# Patient Record
Sex: MALE | Race: WHITE | NOT HISPANIC OR LATINO | Employment: OTHER | URBAN - METROPOLITAN AREA
[De-identification: names, ages, dates, MRNs, and addresses within clinical notes are randomized per-mention and may not be internally consistent; named-entity substitution may affect disease eponyms.]

---

## 2023-06-25 LAB — PROSTATE SPECIFIC ANTIGEN,SCREEN: NORMAL

## 2023-07-06 ENCOUNTER — APPOINTMENT (OUTPATIENT)
Dept: LAB | Facility: LAB | Age: 68
End: 2023-07-06
Payer: MEDICARE

## 2023-07-06 ENCOUNTER — OFFICE VISIT (OUTPATIENT)
Dept: PRIMARY CARE | Facility: CLINIC | Age: 68
End: 2023-07-06
Payer: MEDICARE

## 2023-07-06 VITALS
HEIGHT: 74 IN | BODY MASS INDEX: 26.31 KG/M2 | OXYGEN SATURATION: 99 % | TEMPERATURE: 96.8 F | DIASTOLIC BLOOD PRESSURE: 78 MMHG | HEART RATE: 74 BPM | WEIGHT: 205 LBS | SYSTOLIC BLOOD PRESSURE: 118 MMHG

## 2023-07-06 DIAGNOSIS — M54.42 MIDLINE LOW BACK PAIN WITH BILATERAL SCIATICA, UNSPECIFIED CHRONICITY: Primary | ICD-10-CM

## 2023-07-06 DIAGNOSIS — E78.2 MIXED HYPERLIPIDEMIA: ICD-10-CM

## 2023-07-06 DIAGNOSIS — M54.41 MIDLINE LOW BACK PAIN WITH BILATERAL SCIATICA, UNSPECIFIED CHRONICITY: Primary | ICD-10-CM

## 2023-07-06 DIAGNOSIS — Z11.59 NEED FOR HEPATITIS C SCREENING TEST: ICD-10-CM

## 2023-07-06 DIAGNOSIS — Z00.00 ENCOUNTER FOR ANNUAL PHYSICAL EXAM: ICD-10-CM

## 2023-07-06 DIAGNOSIS — E55.9 VITAMIN D DEFICIENCY: ICD-10-CM

## 2023-07-06 DIAGNOSIS — Z12.11 COLON CANCER SCREENING: ICD-10-CM

## 2023-07-06 DIAGNOSIS — Z11.4 ENCOUNTER FOR SCREENING FOR HIV: ICD-10-CM

## 2023-07-06 PROBLEM — N52.9 ERECTILE DYSFUNCTION: Status: ACTIVE | Noted: 2020-10-30

## 2023-07-06 PROBLEM — H02.059 TRICHIASIS OF EYELID WITHOUT ENTROPION: Status: ACTIVE | Noted: 2023-05-26

## 2023-07-06 PROBLEM — I82.431 ACUTE DEEP VEIN THROMBOSIS (DVT) OF POPLITEAL VEIN OF RIGHT LOWER EXTREMITY (MULTI): Status: RESOLVED | Noted: 2022-02-15 | Resolved: 2023-07-06

## 2023-07-06 PROBLEM — K40.90 REDUCIBLE INGUINAL HERNIA: Status: ACTIVE | Noted: 2018-10-26

## 2023-07-06 PROBLEM — M70.22 OLECRANON BURSITIS, LEFT ELBOW: Status: RESOLVED | Noted: 2021-09-24 | Resolved: 2023-07-06

## 2023-07-06 PROBLEM — H16.102 SUPERFICIAL KERATITIS OF LEFT EYE: Status: RESOLVED | Noted: 2023-05-26 | Resolved: 2023-07-06

## 2023-07-06 PROBLEM — R42 DIZZINESS AND GIDDINESS: Status: RESOLVED | Noted: 2020-11-11 | Resolved: 2023-07-06

## 2023-07-06 PROBLEM — I82.431 ACUTE DEEP VEIN THROMBOSIS (DVT) OF POPLITEAL VEIN OF RIGHT LOWER EXTREMITY (MULTI): Status: ACTIVE | Noted: 2022-02-15

## 2023-07-06 PROBLEM — R05.9 COUGH: Status: RESOLVED | Noted: 2023-07-06 | Resolved: 2023-07-06

## 2023-07-06 PROBLEM — B02.39: Status: RESOLVED | Noted: 2023-05-31 | Resolved: 2023-07-06

## 2023-07-06 PROBLEM — R42 DIZZINESS AND GIDDINESS: Status: ACTIVE | Noted: 2020-11-11

## 2023-07-06 PROBLEM — D17.9 LIPOMA: Status: ACTIVE | Noted: 2023-07-06

## 2023-07-06 PROBLEM — I25.10 CORONARY ARTERY CALCIFICATION: Status: ACTIVE | Noted: 2020-10-30

## 2023-07-06 PROBLEM — I48.0 PAROXYSMAL ATRIAL FIBRILLATION (MULTI): Status: RESOLVED | Noted: 2023-07-06 | Resolved: 2023-07-06

## 2023-07-06 PROBLEM — H02.059 TRICHIASIS OF EYELID WITHOUT ENTROPION: Status: RESOLVED | Noted: 2023-05-26 | Resolved: 2023-07-06

## 2023-07-06 PROBLEM — M70.22 OLECRANON BURSITIS, LEFT ELBOW: Status: ACTIVE | Noted: 2021-09-24

## 2023-07-06 PROBLEM — L60.8 PITTING OF NAILS: Status: RESOLVED | Noted: 2021-09-24 | Resolved: 2023-07-06

## 2023-07-06 PROBLEM — I25.84 CORONARY ARTERY CALCIFICATION: Status: ACTIVE | Noted: 2020-10-30

## 2023-07-06 PROBLEM — N13.8 BPH WITH OBSTRUCTION/LOWER URINARY TRACT SYMPTOMS: Status: RESOLVED | Noted: 2018-10-26 | Resolved: 2023-07-06

## 2023-07-06 PROBLEM — N40.1 BPH WITH OBSTRUCTION/LOWER URINARY TRACT SYMPTOMS: Status: RESOLVED | Noted: 2018-10-26 | Resolved: 2023-07-06

## 2023-07-06 PROBLEM — Z96.641 HISTORY OF RIGHT HIP REPLACEMENT: Status: RESOLVED | Noted: 2021-09-24 | Resolved: 2023-07-06

## 2023-07-06 LAB — PROSTATE SPECIFIC ANTIGEN,SCREEN: 3.28 NG/ML (ref 0–4)

## 2023-07-06 PROCEDURE — 99387 INIT PM E/M NEW PAT 65+ YRS: CPT | Performed by: INTERNAL MEDICINE

## 2023-07-06 PROCEDURE — 1160F RVW MEDS BY RX/DR IN RCRD: CPT | Performed by: INTERNAL MEDICINE

## 2023-07-06 PROCEDURE — 1159F MED LIST DOCD IN RCRD: CPT | Performed by: INTERNAL MEDICINE

## 2023-07-06 PROCEDURE — 1126F AMNT PAIN NOTED NONE PRSNT: CPT | Performed by: INTERNAL MEDICINE

## 2023-07-06 RX ORDER — TAMSULOSIN HYDROCHLORIDE 0.4 MG/1
0.4 CAPSULE ORAL EVERY EVENING
COMMUNITY

## 2023-07-06 ASSESSMENT — ENCOUNTER SYMPTOMS
HEMATURIA: 0
HALLUCINATIONS: 0
DIZZINESS: 0
NUMBNESS: 0
NERVOUS/ANXIOUS: 0
SPEECH DIFFICULTY: 0
ABDOMINAL DISTENTION: 0
POLYPHAGIA: 0
ARTHRALGIAS: 1
MYALGIAS: 1
SHORTNESS OF BREATH: 0
CONFUSION: 0
DYSURIA: 0
WEAKNESS: 0
RHINORRHEA: 0
DIARRHEA: 0
FLANK PAIN: 0
POLYDIPSIA: 0
FATIGUE: 0
BACK PAIN: 1
BRUISES/BLEEDS EASILY: 0
NECK STIFFNESS: 0
FEVER: 0
SINUS PRESSURE: 0
HEADACHES: 0
SINUS PAIN: 0
STRIDOR: 0
ABDOMINAL PAIN: 0
NAUSEA: 0
SEIZURES: 0
EYE REDNESS: 0
TROUBLE SWALLOWING: 0
COLOR CHANGE: 0
DYSPHORIC MOOD: 0
VOMITING: 0
WHEEZING: 0
CONSTIPATION: 0
CHOKING: 0
COUGH: 0
VOICE CHANGE: 0
PALPITATIONS: 0
DIFFICULTY URINATING: 0
ACTIVITY CHANGE: 0
FACIAL ASYMMETRY: 0
BLOOD IN STOOL: 0
EYE DISCHARGE: 0
APPETITE CHANGE: 0
FREQUENCY: 0
CHEST TIGHTNESS: 0
PHOTOPHOBIA: 0

## 2023-07-06 ASSESSMENT — PAIN SCALES - GENERAL: PAINLEVEL: 0-NO PAIN

## 2023-07-06 NOTE — PROGRESS NOTES
Subjective   Patient ID: Austyn Alfaro is a 67 y.o. male who presents for Establish Care (New patient, bilateral leg pain at night since the beginning of the year).    HPI   Patient previous PCP moved to [facility] and he need new PCP.    Only medication he is taking is Flomax for BPH and his PSA level was checked 6 months ago and it was 2.94.  Patient is having symptoms of Arthralgia from past several months. He also has low back pain which started more than 2 decades ago but lately from past few months he noticed pain in both legs. Its especially worse when laying down on the side. He is a side sleeper but lately sleeping on his back. The pain in lower extremities is more isolated and localized to hip, knee and ankles on both sides. His pain is more when going to bed but improves as the day progresses.    He had routine labs done in 2021 and his LDL is high and need to be checked again.  He is also due for screening colonoscopy and last one was done in 2007.    He had right hip replacement more than 10 years ago because of severe hip OA.     Review of Systems   Constitutional:  Negative for activity change, appetite change, fatigue and fever.   HENT:  Negative for congestion, dental problem, ear pain, hearing loss, rhinorrhea, sinus pressure, sinus pain, trouble swallowing and voice change.    Eyes:  Negative for photophobia, discharge, redness and visual disturbance.   Respiratory:  Negative for cough, choking, chest tightness, shortness of breath, wheezing and stridor.    Cardiovascular:  Negative for chest pain, palpitations and leg swelling.   Gastrointestinal:  Negative for abdominal distention, abdominal pain, blood in stool, constipation, diarrhea, nausea and vomiting.   Endocrine: Negative for polydipsia, polyphagia and polyuria.   Genitourinary:  Negative for difficulty urinating, dysuria, flank pain, frequency, hematuria and urgency.   Musculoskeletal:  Positive for arthralgias, back pain and myalgias.  "Negative for neck stiffness.   Skin:  Negative for color change and rash.   Allergic/Immunologic: Negative for immunocompromised state.   Neurological:  Negative for dizziness, seizures, syncope, facial asymmetry, speech difficulty, weakness, numbness and headaches.   Hematological:  Does not bruise/bleed easily.   Psychiatric/Behavioral:  Negative for behavioral problems, confusion, dysphoric mood, hallucinations and suicidal ideas. The patient is not nervous/anxious.      Objective   /78   Pulse 74   Temp 36 °C (96.8 °F)   Ht 1.88 m (6' 2\")   Wt 93 kg (205 lb)   SpO2 99%   BMI 26.32 kg/m²     Physical Exam  Constitutional:       General: He is not in acute distress.     Appearance: Normal appearance. He is not ill-appearing or toxic-appearing.   HENT:      Head: Normocephalic and atraumatic.      Nose: No congestion or rhinorrhea.   Eyes:      General: No scleral icterus.        Right eye: No discharge.         Left eye: No discharge.      Extraocular Movements: Extraocular movements intact.      Conjunctiva/sclera: Conjunctivae normal.      Pupils: Pupils are equal, round, and reactive to light.   Pulmonary:      Effort: Pulmonary effort is normal.   Musculoskeletal:         General: No swelling or deformity. Normal range of motion.      Cervical back: Normal range of motion.      Right lower leg: No edema.      Left lower leg: No edema.   Skin:     Coloration: Skin is not jaundiced.      Findings: No erythema or rash.   Neurological:      General: No focal deficit present.      Mental Status: He is alert and oriented to person, place, and time.      Cranial Nerves: No cranial nerve deficit.      Motor: No weakness.      Coordination: Coordination normal.      Gait: Gait normal.   Psychiatric:         Mood and Affect: Mood normal.         Behavior: Behavior normal.         Thought Content: Thought content normal.         Judgment: Judgment normal.       Assessment/Plan   Problem List Items Addressed " This Visit    None  Visit Diagnoses       Midline low back pain with bilateral sciatica, unspecified chronicity    -  Primary    Relevant Orders    MR lumbar spine wo IV contrast    Colon cancer screening        Relevant Orders    Colonoscopy    Encounter for annual physical exam        Relevant Orders    Hemoglobin A1C    Comprehensive Metabolic Panel    Tsh With Reflex To Free T4 If Abnormal    Mixed hyperlipidemia        Relevant Orders    Lipid Panel    Need for hepatitis C screening test        Relevant Orders    Hepatitis C antibody    Encounter for screening for HIV        Relevant Orders    HIV 1/2 Antigen/Antibody Screen with Reflex to Confirmation    Vitamin D deficiency        Relevant Orders    CBC and Auto Differential    Vitamin D, Total        I will do full physical exam next time. Today it was first visit and reviews of all the information and discussion with patient took more than expected. Routine labs were ordered and Colonoscopy ordered and printed as patient requested.

## 2023-08-07 ENCOUNTER — TELEPHONE (OUTPATIENT)
Dept: PRIMARY CARE | Facility: CLINIC | Age: 68
End: 2023-08-07
Payer: MEDICARE

## 2023-08-07 NOTE — TELEPHONE ENCOUNTER
----- Message from Xavier Newton MD sent at 8/7/2023  9:38 AM EDT -----  I looked at your MRI Lumbar spine. You do have intervertebral disc problem which is consistent with your symptoms at multiple level. Its hard for me to describe it over the phone. Its not emergency and I can discuss with you at follow up visit but if you do have concern than you can come sooner. If you are feeling better than we dont have to do anything else.

## 2023-09-09 PROBLEM — C44.722 SQUAMOUS CELL CARCINOMA OF SKIN OF RIGHT LOWER LIMB, INCLUDING HIP: Status: ACTIVE | Noted: 2023-02-01

## 2023-09-27 ENCOUNTER — LAB (OUTPATIENT)
Dept: LAB | Facility: LAB | Age: 68
End: 2023-09-27
Payer: MEDICARE

## 2023-09-27 DIAGNOSIS — Z00.00 ENCOUNTER FOR ANNUAL PHYSICAL EXAM: ICD-10-CM

## 2023-09-27 DIAGNOSIS — Z11.4 ENCOUNTER FOR SCREENING FOR HIV: ICD-10-CM

## 2023-09-27 DIAGNOSIS — E55.9 VITAMIN D DEFICIENCY: ICD-10-CM

## 2023-09-27 DIAGNOSIS — Z11.59 NEED FOR HEPATITIS C SCREENING TEST: ICD-10-CM

## 2023-09-27 DIAGNOSIS — E78.2 MIXED HYPERLIPIDEMIA: ICD-10-CM

## 2023-09-27 LAB
ALANINE AMINOTRANSFERASE (SGPT) (U/L) IN SER/PLAS: 27 U/L (ref 10–52)
ALBUMIN (G/DL) IN SER/PLAS: 4.1 G/DL (ref 3.4–5)
ALKALINE PHOSPHATASE (U/L) IN SER/PLAS: 69 U/L (ref 33–136)
ANION GAP IN SER/PLAS: 12 MMOL/L (ref 10–20)
ASPARTATE AMINOTRANSFERASE (SGOT) (U/L) IN SER/PLAS: 26 U/L (ref 9–39)
BASOPHILS (10*3/UL) IN BLOOD BY AUTOMATED COUNT: 0.06 X10E9/L (ref 0–0.1)
BASOPHILS/100 LEUKOCYTES IN BLOOD BY AUTOMATED COUNT: 0.9 % (ref 0–2)
BILIRUBIN TOTAL (MG/DL) IN SER/PLAS: 0.7 MG/DL (ref 0–1.2)
CALCIUM (MG/DL) IN SER/PLAS: 8.9 MG/DL (ref 8.6–10.3)
CARBON DIOXIDE, TOTAL (MMOL/L) IN SER/PLAS: 29 MMOL/L (ref 21–32)
CHLORIDE (MMOL/L) IN SER/PLAS: 103 MMOL/L (ref 98–107)
CHOLESTEROL (MG/DL) IN SER/PLAS: 217 MG/DL (ref 0–199)
CHOLESTEROL IN HDL (MG/DL) IN SER/PLAS: 51.7 MG/DL
CHOLESTEROL/HDL RATIO: 4.2
CREATININE (MG/DL) IN SER/PLAS: 0.82 MG/DL (ref 0.5–1.3)
EOSINOPHILS (10*3/UL) IN BLOOD BY AUTOMATED COUNT: 0.51 X10E9/L (ref 0–0.7)
EOSINOPHILS/100 LEUKOCYTES IN BLOOD BY AUTOMATED COUNT: 7.7 % (ref 0–6)
ERYTHROCYTE DISTRIBUTION WIDTH (RATIO) BY AUTOMATED COUNT: 13 % (ref 11.5–14.5)
ERYTHROCYTE MEAN CORPUSCULAR HEMOGLOBIN CONCENTRATION (G/DL) BY AUTOMATED: 32.8 G/DL (ref 32–36)
ERYTHROCYTE MEAN CORPUSCULAR VOLUME (FL) BY AUTOMATED COUNT: 95 FL (ref 80–100)
ERYTHROCYTES (10*6/UL) IN BLOOD BY AUTOMATED COUNT: 4.89 X10E12/L (ref 4.5–5.9)
GFR MALE: >90 ML/MIN/1.73M2
GLUCOSE (MG/DL) IN SER/PLAS: 83 MG/DL (ref 74–99)
HEMATOCRIT (%) IN BLOOD BY AUTOMATED COUNT: 46.4 % (ref 41–52)
HEMOGLOBIN (G/DL) IN BLOOD: 15.2 G/DL (ref 13.5–17.5)
IMMATURE GRANULOCYTES/100 LEUKOCYTES IN BLOOD BY AUTOMATED COUNT: 0.2 % (ref 0–0.9)
LDL: 152 MG/DL (ref 0–99)
LEUKOCYTES (10*3/UL) IN BLOOD BY AUTOMATED COUNT: 6.6 X10E9/L (ref 4.4–11.3)
LYMPHOCYTES (10*3/UL) IN BLOOD BY AUTOMATED COUNT: 0.98 X10E9/L (ref 1.2–4.8)
LYMPHOCYTES/100 LEUKOCYTES IN BLOOD BY AUTOMATED COUNT: 14.8 % (ref 13–44)
MONOCYTES (10*3/UL) IN BLOOD BY AUTOMATED COUNT: 0.83 X10E9/L (ref 0.1–1)
MONOCYTES/100 LEUKOCYTES IN BLOOD BY AUTOMATED COUNT: 12.5 % (ref 2–10)
NEUTROPHILS (10*3/UL) IN BLOOD BY AUTOMATED COUNT: 4.24 X10E9/L (ref 1.2–7.7)
NEUTROPHILS/100 LEUKOCYTES IN BLOOD BY AUTOMATED COUNT: 63.9 % (ref 40–80)
PLATELETS (10*3/UL) IN BLOOD AUTOMATED COUNT: 289 X10E9/L (ref 150–450)
POTASSIUM (MMOL/L) IN SER/PLAS: 4.8 MMOL/L (ref 3.5–5.3)
PROTEIN TOTAL: 6.9 G/DL (ref 6.4–8.2)
SODIUM (MMOL/L) IN SER/PLAS: 139 MMOL/L (ref 136–145)
THYROTROPIN (MIU/L) IN SER/PLAS BY DETECTION LIMIT <= 0.05 MIU/L: 1 MIU/L (ref 0.44–3.98)
TRIGLYCERIDE (MG/DL) IN SER/PLAS: 66 MG/DL (ref 0–149)
UREA NITROGEN (MG/DL) IN SER/PLAS: 17 MG/DL (ref 6–23)
VLDL: 13 MG/DL (ref 0–40)

## 2023-09-27 PROCEDURE — 85025 COMPLETE CBC W/AUTO DIFF WBC: CPT

## 2023-09-27 PROCEDURE — 86803 HEPATITIS C AB TEST: CPT

## 2023-09-27 PROCEDURE — 84443 ASSAY THYROID STIM HORMONE: CPT

## 2023-09-27 PROCEDURE — 36415 COLL VENOUS BLD VENIPUNCTURE: CPT

## 2023-09-27 PROCEDURE — 87389 HIV-1 AG W/HIV-1&-2 AB AG IA: CPT

## 2023-09-27 PROCEDURE — 82306 VITAMIN D 25 HYDROXY: CPT

## 2023-09-27 PROCEDURE — 83036 HEMOGLOBIN GLYCOSYLATED A1C: CPT

## 2023-09-27 PROCEDURE — 80053 COMPREHEN METABOLIC PANEL: CPT

## 2023-09-27 PROCEDURE — 80061 LIPID PANEL: CPT

## 2023-09-28 LAB
CALCIDIOL (25 OH VITAMIN D3) (NG/ML) IN SER/PLAS: 28 NG/ML
ESTIMATED AVERAGE GLUCOSE FOR HBA1C: 105 MG/DL
HEMOGLOBIN A1C/HEMOGLOBIN TOTAL IN BLOOD: 5.3 %
HEPATITIS C VIRUS AB PRESENCE IN SERUM: NONREACTIVE
HIV 1/ 2 AG/AB SCREEN: NONREACTIVE

## 2023-10-02 ENCOUNTER — EVALUATION (OUTPATIENT)
Dept: PHYSICAL THERAPY | Facility: CLINIC | Age: 68
End: 2023-10-02
Payer: MEDICARE

## 2023-10-02 DIAGNOSIS — M79.604 LOWER EXTREMITY PAIN, BILATERAL: Primary | ICD-10-CM

## 2023-10-02 DIAGNOSIS — M79.605 LOWER EXTREMITY PAIN, BILATERAL: Primary | ICD-10-CM

## 2023-10-02 DIAGNOSIS — M54.50 LOW BACK PAIN, UNSPECIFIED: ICD-10-CM

## 2023-10-02 PROCEDURE — 97162 PT EVAL MOD COMPLEX 30 MIN: CPT | Mod: GP | Performed by: PHYSICAL THERAPIST

## 2023-10-02 ASSESSMENT — ENCOUNTER SYMPTOMS
OCCASIONAL FEELINGS OF UNSTEADINESS: 0
DEPRESSION: 0
LOSS OF SENSATION IN FEET: 0

## 2023-10-02 NOTE — LETTER
October 2, 2023     Patient: Austyn Alfaro   YOB: 1955   Date of Visit: 10/2/2023       To Whom it May Concern:    Austyn Alfaro was seen in my clinic on 10/2/2023. He {Return to school/sport:30574}.    If you have any questions or concerns, please don't hesitate to call.         Sincerely,          Dayana Harris, PT        CC: No Recipients

## 2023-10-02 NOTE — LETTER
October 2, 2023     Patient: Austyn Alfaro   YOB: 1955   Date of Visit: 10/2/2023       To Whom It May Concern:    It is my medical opinion that Austyn Alfaro {Work release (duty restriction):87316}.    If you have any questions or concerns, please don't hesitate to call.         Sincerely,        Dayana Harris, PT    CC: No Recipients

## 2023-10-02 NOTE — PROGRESS NOTES
Physical Therapy    Physical Therapy Evaluation    Patient Name: Austyn Alfaro  MRN: [id number]  Today's Date: 10/2/2023  Time Calculation  Start Time: 1015  Stop Time: 1100  Time Calculation (min): 45 min    Assessment:  Pt is a 67 yo male with complaint of bilateral LE pain.  During evaluation, he reports and demonstrates symptoms consistent with a preliminary classification of lumbar derangement. Preliminary directional preference toward lumbar extension.  He will benefit from further PT assessment to confirm classification and directional preference. He may also benefit from further PT for postural strengthening.     PT Assessment Results: Decreased range of motion, Pain (postural impairment)  Rehab Prognosis: Good  Evaluation/Treatment Tolerance: Patient tolerated treatment well    Plan  Treatment/Interventions: Cryotherapy, Education/ Instruction, Hot pack, Manual therapy, Taping techniques, Therapeutic activities, Therapeutic exercises  PT Frequency: 2 times per week  Duration:  (4-8 weeks)  Onset Date:  (script 9-)  Certification Period Start Date: 10/02/23  Certification Period End Date: 01/02/24  Rehab Potential: Good  Plan of Care Agreement: Patient        Subjective   General:  Pt presents to PT with referral from physician for acute lumbar back pain.  He reports intermittent bilateral anterior LE pain 'that feels like an electric current in both legs'.  His symptoms began gradually in February 2023. He does not recall any injury or trauma that caused his symptoms to begin.  The pain occurs above and below the knee down the dorsum of his feet at times.  The pain occurs at night and 'intensifies' with side lying and is present but less intense in supine.  Pain is worse in the mornings upon waking.  He feels better during the day and with movement such as walking, running, tennis, and working outdoors.  He has pain at times during the day with sitting and standing.    General  Reason for Referral:   (acute lumbar back pain)  Referred By: Isacc Alfaro MD  Past Medical History Relevant to Rehab: PMH includes OA, right TK, right shoulder AC repair, cardiac catheter ablation for atrial fibrillation March 2020  Precautions:  Precautions  STEADI Fall Risk Score (The score of 4 or more indicates an increased risk of falling): 0  Precautions Comment: remote right TK     Pain:  Pain Assessment:  (Pt reports intermittent bilateral anterior LE pain down to the dorsum of his feet from 0-6/10.)  Home Living:  Independent      Prior Function Per Pt/Caregiver Report:  Level of [place]: Independent with ADLs and functional transfers, Independent with homemaking with ambulation    Objective      NE = no effect  Lumbar Spine:  Standing  Lumbar flexion NE  Repeated Lumbar flexion NE  Lumbar extension NE  Repeated Lumbar extension NE  Lumbar side glide left produced, no worse  Lumbar side glide right NE    Supine:  Modified lumbar flexion (ball under lower legs for support and hip flexion no more than 90 degrees) NE  Repeated Modified lumbar flexion (ball under lower legs for support and hip flexion no more than 90 degrees) NE    Prone:  Lumbar EIL NE  Repeated Lumbar EIL NE      Observation/Posture:  Lumbar:  (thoracic kyphosis, rounded shoulders)       Lumbar AROM     Hip AROM  History of remote right TK, WFL and no pain  Left hip WFL, no pain     Lumbar Myotomes  Lumbar Myotomes WFL: yes    Specific Lower Extremity MMT  Knee extension 5/5  Knee flexion 5/5  Hip flexion 5/5  Ankle dorsiflexion 5/5  Ankle plantarflexion 5/5        Dermatomes  Dermatomes WFL: yes  Special Tests  SLR negative L/R      Outcome Measures:    Oswestry LBP Disability Index for [facility] = 13 points    OP EDUCATION:  Initiated HEP and provided handouts  Education  Individual(s) Educated: Patient  Education Provided: POC, Anatomy, Body Mechanics, Home Exercise Program (Principles of centralization)  Patient/Caregiver Demonstrated  Understanding: yes  Patient Response to Education: Patient/Caregiver Verbalized Understanding of Information, Patient/Caregiver Performed Return Demonstration of Exercises/Activities    Goals:  Encounter Problems       Encounter Problems (Active)       PT Problem       PT Goal 1       Start:  10/02/23    Expected End:  12/02/23       Patient will demonstrate independence and compliance with safe and appropriate HEP for ROM, strength, stabilization, balance, pain, edema          PT Goal 2       Start:  10/02/23    Expected End:  12/02/23       Patient will improve outcome measures score of Oswestry to 2 points or less for increased independence and ease with ADL, IADL's, skills and work/leisure activities          PT Goal 3       Start:  10/02/23    Expected End:  12/02/23       Patient will demonstrate full and pain free AROM of lumbar spine to participate in ADL, IADL, work and leisure activities          PT Goal 4       Start:  10/02/23    Expected End:  12/02/23       Patient will be independent with symptoms management in order to decrease pain to 0/10, in order to increase participation with ADL, IADL, work and leisure skills

## 2023-10-10 ENCOUNTER — OFFICE VISIT (OUTPATIENT)
Dept: PRIMARY CARE | Facility: CLINIC | Age: 68
End: 2023-10-10
Payer: MEDICARE

## 2023-10-10 VITALS
DIASTOLIC BLOOD PRESSURE: 82 MMHG | BODY MASS INDEX: 26.56 KG/M2 | SYSTOLIC BLOOD PRESSURE: 134 MMHG | HEART RATE: 61 BPM | WEIGHT: 207 LBS | TEMPERATURE: 97 F | HEIGHT: 74 IN | OXYGEN SATURATION: 96 %

## 2023-10-10 DIAGNOSIS — E78.00 PURE HYPERCHOLESTEROLEMIA: ICD-10-CM

## 2023-10-10 DIAGNOSIS — Z00.00 ROUTINE GENERAL MEDICAL EXAMINATION AT HEALTH CARE FACILITY: Primary | ICD-10-CM

## 2023-10-10 PROCEDURE — 1160F RVW MEDS BY RX/DR IN RCRD: CPT | Performed by: INTERNAL MEDICINE

## 2023-10-10 PROCEDURE — 1170F FXNL STATUS ASSESSED: CPT | Performed by: INTERNAL MEDICINE

## 2023-10-10 PROCEDURE — 1126F AMNT PAIN NOTED NONE PRSNT: CPT | Performed by: INTERNAL MEDICINE

## 2023-10-10 PROCEDURE — 1159F MED LIST DOCD IN RCRD: CPT | Performed by: INTERNAL MEDICINE

## 2023-10-10 PROCEDURE — G0439 PPPS, SUBSEQ VISIT: HCPCS | Performed by: INTERNAL MEDICINE

## 2023-10-10 PROCEDURE — 1036F TOBACCO NON-USER: CPT | Performed by: INTERNAL MEDICINE

## 2023-10-10 ASSESSMENT — ENCOUNTER SYMPTOMS
HEMATURIA: 0
SHORTNESS OF BREATH: 0
DYSPHORIC MOOD: 0
BRUISES/BLEEDS EASILY: 0
WHEEZING: 0
CHOKING: 0
BLOOD IN STOOL: 0
SINUS PRESSURE: 0
NAUSEA: 0
DYSURIA: 0
FATIGUE: 0
WEAKNESS: 0
NECK STIFFNESS: 0
APPETITE CHANGE: 0
HEADACHES: 0
EYE DISCHARGE: 0
DIFFICULTY URINATING: 0
CONFUSION: 0
ACTIVITY CHANGE: 0
HALLUCINATIONS: 0
ABDOMINAL PAIN: 0
LIGHT-HEADEDNESS: 0
SINUS PAIN: 0
VOICE CHANGE: 0
TREMORS: 0
FEVER: 0
PHOTOPHOBIA: 0
PALPITATIONS: 0
VOMITING: 0
ABDOMINAL DISTENTION: 0
DIARRHEA: 0
CONSTIPATION: 0
STRIDOR: 0
DIZZINESS: 0
FACIAL ASYMMETRY: 0
SEIZURES: 0
NUMBNESS: 0
ARTHRALGIAS: 0
RHINORRHEA: 0
EYE REDNESS: 0
FREQUENCY: 0
COLOR CHANGE: 0
POLYPHAGIA: 0
NERVOUS/ANXIOUS: 0
BACK PAIN: 1
COUGH: 0
POLYDIPSIA: 0
SPEECH DIFFICULTY: 0
TROUBLE SWALLOWING: 0
WOUND: 0
FLANK PAIN: 0
DECREASED CONCENTRATION: 0
MYALGIAS: 0
CHEST TIGHTNESS: 0

## 2023-10-10 ASSESSMENT — ACTIVITIES OF DAILY LIVING (ADL)
HEARING - RIGHT EAR: FUNCTIONAL
FEEDING YOURSELF: INDEPENDENT
DOING_HOUSEWORK: INDEPENDENT
GROCERY_SHOPPING: INDEPENDENT
GROOMING: INDEPENDENT
MANAGING_FINANCES: INDEPENDENT
HEARING - LEFT EAR: FUNCTIONAL
DRESSING: INDEPENDENT
BATHING: INDEPENDENT
JUDGMENT_ADEQUATE_SAFELY_COMPLETE_DAILY_ACTIVITIES: YES
TAKING_MEDICATION: INDEPENDENT
PATIENT'S MEMORY ADEQUATE TO SAFELY COMPLETE DAILY ACTIVITIES?: YES
TOILETING: INDEPENDENT
ADEQUATE_TO_COMPLETE_ADL: YES

## 2023-10-10 ASSESSMENT — PATIENT HEALTH QUESTIONNAIRE - PHQ9
2. FEELING DOWN, DEPRESSED OR HOPELESS: NOT AT ALL
1. LITTLE INTEREST OR PLEASURE IN DOING THINGS: NOT AT ALL
SUM OF ALL RESPONSES TO PHQ9 QUESTIONS 1 AND 2: 0

## 2023-10-10 NOTE — PROGRESS NOTES
Subjective   Reason for Visit: Austyn Alfaro is an 68 y.o. male here for a Medicare Wellness visit.     Past Medical, Surgical, and Family History reviewed and updated in chart.    Reviewed all medications by prescribing practitioner or clinical pharmacist (such as prescriptions, OTCs, herbal therapies and supplements) and documented in the medical record.    HPI  Patient seen in office today for Medicare wellness visit. Patient is compliant with Tamsulosin.    Patient Care Team:  Xavier Newton MD as PCP - General (Internal Medicine)     Review of Systems   Constitutional:  Negative for activity change, appetite change, fatigue and fever.   HENT:  Negative for congestion, dental problem, ear pain, hearing loss, rhinorrhea, sinus pressure, sinus pain, trouble swallowing and voice change.    Eyes:  Negative for photophobia, discharge, redness and visual disturbance.   Respiratory:  Negative for cough, choking, chest tightness, shortness of breath, wheezing and stridor.    Cardiovascular:  Negative for chest pain, palpitations and leg swelling.   Gastrointestinal:  Negative for abdominal distention, abdominal pain, blood in stool, constipation, diarrhea, nausea and vomiting.   Endocrine: Negative for polydipsia, polyphagia and polyuria.   Genitourinary:  Negative for difficulty urinating, dysuria, flank pain, frequency, hematuria and urgency.   Musculoskeletal:  Positive for back pain. Negative for arthralgias, gait problem, myalgias and neck stiffness.   Skin:  Negative for color change, rash and wound.   Allergic/Immunologic: Negative for immunocompromised state.   Neurological:  Negative for dizziness, tremors, seizures, syncope, facial asymmetry, speech difficulty, weakness, light-headedness, numbness and headaches.   Hematological:  Does not bruise/bleed easily.   Psychiatric/Behavioral:  Negative for behavioral problems, confusion, decreased concentration, dysphoric mood, hallucinations, self-injury and suicidal  "ideas. The patient is not nervous/anxious.      Objective   Vitals:  /82   Pulse 61   Temp 36.1 °C (97 °F)   Ht 1.88 m (6' 2\")   Wt 93.9 kg (207 lb)   SpO2 96%   BMI 26.58 kg/m²       Physical Exam  Constitutional:       General: He is not in acute distress.     Appearance: Normal appearance. He is not ill-appearing or toxic-appearing.   HENT:      Head: Normocephalic and atraumatic.      Nose: Nose normal. No congestion or rhinorrhea.      Mouth/Throat:      Mouth: Mucous membranes are moist.   Eyes:      General: No scleral icterus.     Extraocular Movements: Extraocular movements intact.      Conjunctiva/sclera: Conjunctivae normal.      Pupils: Pupils are equal, round, and reactive to light.   Cardiovascular:      Rate and Rhythm: Normal rate and regular rhythm.      Pulses: Normal pulses.      Heart sounds: Normal heart sounds. No murmur heard.     No gallop.   Pulmonary:      Effort: Pulmonary effort is normal. No respiratory distress.      Breath sounds: Normal breath sounds. No stridor. No wheezing, rhonchi or rales.   Abdominal:      General: Bowel sounds are normal.      Palpations: Abdomen is soft.      Tenderness: There is no abdominal tenderness.   Musculoskeletal:         General: No swelling or deformity. Normal range of motion.      Cervical back: Normal range of motion and neck supple. No rigidity or tenderness.      Right lower leg: No edema.      Left lower leg: No edema.   Lymphadenopathy:      Cervical: No cervical adenopathy.   Skin:     General: Skin is warm.      Coloration: Skin is not jaundiced.      Findings: No erythema or lesion.   Neurological:      General: No focal deficit present.      Mental Status: He is alert and oriented to person, place, and time.      Cranial Nerves: No cranial nerve deficit.      Motor: No weakness.      Coordination: Coordination normal.      Gait: Gait normal.   Psychiatric:         Mood and Affect: Mood normal.         Behavior: Behavior normal. "         Thought Content: Thought content normal.         Judgment: Judgment normal.       Assessment/Plan   Problem List Items Addressed This Visit          Cardiac and Vasculature    Pure hypercholesterolemia    Relevant Orders    Lipid Panel    CT cardiac scoring wo IV contrast       Health Encounters    Routine general medical examination at health care facility - Primary    Current Assessment & Plan     Patient is due for Pneumonia and Tetanus booster and he has been discussed.

## 2023-10-12 ENCOUNTER — APPOINTMENT (OUTPATIENT)
Dept: CARDIOLOGY | Facility: CLINIC | Age: 68
End: 2023-10-12
Payer: MEDICARE

## 2023-10-13 ENCOUNTER — TREATMENT (OUTPATIENT)
Dept: PHYSICAL THERAPY | Facility: CLINIC | Age: 68
End: 2023-10-13
Payer: MEDICARE

## 2023-10-13 DIAGNOSIS — M79.605 LOWER EXTREMITY PAIN, BILATERAL: Primary | ICD-10-CM

## 2023-10-13 DIAGNOSIS — M79.604 LOWER EXTREMITY PAIN, BILATERAL: Primary | ICD-10-CM

## 2023-10-13 PROCEDURE — 97110 THERAPEUTIC EXERCISES: CPT | Mod: GP

## 2023-10-13 NOTE — PROGRESS NOTES
"Physical Therapy    Physical Therapy Evaluation and Treatment      Patient Name: Austyn Alfaro  MRN: [id number]  Today's Date: 10/13/2023  Time Calculation  Start Time: 1350  Stop Time: 1430  Time Calculation (min): 40 min      Assessment:  Patient demonstrates signs and symptoms consistent with Emerald lumbar derangement classification, but is showing a directional preference for lumbar flexion.  Lumbar flexion in lying was beneficial for decreasing L LE pain/tingling and helping to centralize symptoms towards LB. Modified HEP to include lumbar flexion in lying, in place of lumbar extension.  Patient reported less pain in LLE at the end of session as compared to when he arrived. Patient educated in centralization principles and was advised to stop lumbar flexion if leg symptoms increase.  Patient verbalizes understanding and agreement.        Plan:  Assess response to today's treatment and progress accordingly.        Current Problem:   1. Lower extremity pain, bilateral            Subjective    General:  Patient reports he has been doing the HEP every 2 hours since Evaluation.  But has noticed  increased intensity and frequency of leg symptoms the last 2 weeks.  Prior to therapy movement was beneficial.  Now getting pain more often during the day, and still consistent at night.  Currently reports  central LBP 3/10 and pain/tingling  in back of the  L thigh (high up near buttocks),  front of L knee and in L foot.  No shin pain at present.  Has history of R TK 10 yrs ago,  states he is very active and no longer follows hip precautions.      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 - removed earlier   Fall Risk low  Remote R TK (pt reports 10 yrs ago)       Treatments:  Therapeutic Exercise  Therapeutic Exercise Performed: Yes  Therapeutic Exercise Activity 1: Hooklying PPT 5\" hold 2 x 10  Therapeutic Exercise Activity 2: Hooklying Emerald MACRINA 2 x 10 reps (Patient reports this exercise " "significantly decreased the LLE symptoms.)  Therapeutic Exercise Activity 3: Hooklying R/L SKTC 3 x 20\" hold (Gentle with R hip, within tolerance and painfree ROM)  Therapeutic Exercise Activity 4: Hooklying R/L hamstring stretch with strap 3 x 20\" hold  Therapeutic Exercise Activity 5: Hooklying LLE only piriformis stretch 3 x 20\" (This increased LLE pain and tingling.)  Therapeutic Exercise Activity 6: Repeated hooklying Emerald MACRINA 1 x 10 reps (Again, this exercise reduced LLE symptoms.)  OP EDUCATION:  Modified HEP: Discontinued standing back bends and prone press ups.  Replaced with lumbar flexion in lying 10-15 reps every 2-3 hours.  Educated patient in centralization/peripheralization principles as they relate to patient symptoms.  Written instructions provided for pt reference.        Goals:  Active       PT Problem       PT Goal 1       Start:  10/02/23    Expected End:  12/02/23       Patient will demonstrate independence and compliance with safe and appropriate HEP for ROM, strength, stabilization, balance, pain, edema          PT Goal 2       Start:  10/02/23    Expected End:  12/02/23       Patient will improve outcome measures score of Oswestry to 2 points or less for increased independence and ease with ADL, IADL's, skills and work/leisure activities          PT Goal 3       Start:  10/02/23    Expected End:  12/02/23       Patient will demonstrate full and pain free AROM of lumbar spine to participate in ADL, IADL, work and leisure activities          PT Goal 4       Start:  10/02/23    Expected End:  12/02/23       Patient will be independent with symptoms management in order to decrease pain to 0/10, in order to increase participation with ADL, IADL, work and leisure skills                    "

## 2023-10-16 ENCOUNTER — TREATMENT (OUTPATIENT)
Dept: PHYSICAL THERAPY | Facility: CLINIC | Age: 68
End: 2023-10-16
Payer: MEDICARE

## 2023-10-16 DIAGNOSIS — M79.604 LOWER EXTREMITY PAIN, BILATERAL: Primary | ICD-10-CM

## 2023-10-16 DIAGNOSIS — M79.605 LOWER EXTREMITY PAIN, BILATERAL: Primary | ICD-10-CM

## 2023-10-16 PROCEDURE — 97110 THERAPEUTIC EXERCISES: CPT | Mod: GP

## 2023-10-16 NOTE — PROGRESS NOTES
"Physical Therapy    Physical Therapy Evaluation and Treatment      Patient Name: Austyn Alfaro  MRN: [id number]  Today's Date: 10/16/2023         Assessment:       Plan:       Current Problem:   1. Lower extremity pain, bilateral            Subjective   Patient performed  the lumbar flexion based exercises on Friday and Friday night went to a concert and sat for a couple hours.  Experienced severe (\"the worst that I have felt since this happened\") electrical current in left  knee and ankle intermittent electrical current L ankle.  Difficulty sleeping.  Lasted until sat 4p then no Pain.  Better on Sunday.  Didn't do the exercises on satu or Sunday.   Nerve pain at present,   Friday and Saturday, nerve pain in L great toe.  Presntlely nerve pain in L 2-3 toes.  Pain 1-2/10, central LBP. No leg pain.   Hesitant to move due to feeling ok right now and will be playing tennis tonight.  Better with bridges and hanging to decompress spine is helpful.   General:     Precautions:     Vital Signs:     Pain:     Home Living:     Prior Level of Function:       Objective   Cognition:     General Assessments:  {General Assessments:57223}  Functional Assessments:  {Functional Assessments:85667}  Extremity/Trunk Assessments:  {Extremity/Trunk Assessments:97709}    {Spine,UE,LE,HAND,LYMPHEDEMA:40590}    Outcome Measures:  {PT Outcome Measures:94898}     Treatments:  {PT Treatments:28462}  Activity:  {Activity:12958}    OP EDUCATION:       Goals:  Active       PT Problem       PT Goal 1       Start:  10/02/23    Expected End:  12/02/23       Patient will demonstrate independence and compliance with safe and appropriate HEP for ROM, strength, stabilization, balance, pain, edema          PT Goal 2       Start:  10/02/23    Expected End:  12/02/23       Patient will improve outcome measures score of Oswestry to 2 points or less for increased independence and ease with ADL, IADL's, skills and work/leisure activities          PT Goal 3  "      Start:  10/02/23    Expected End:  12/02/23       Patient will demonstrate full and pain free AROM of lumbar spine to participate in ADL, IADL, work and leisure activities          PT Goal 4       Start:  10/02/23    Expected End:  12/02/23       Patient will be independent with symptoms management in order to decrease pain to 0/10, in order to increase participation with ADL, IADL, work and leisure skills

## 2023-10-16 NOTE — PROGRESS NOTES
"Physical Therapy    Physical Therapy Treatment    Patient Name: Austyn Alfaro  MRN: [id number]  Today's Date: 10/16/2023  Time Calculation  Start Time: 0802  Stop Time: 0845  Time Calculation (min): 43 min      Assessment:  Difficult to identify direction of preference, however HEP of lumbar flexion and end range extension appear to aggravate symptoms in his left leg per pt report. Patient given lumbar extension in mid range for HEP.  He may also have a lateral component contributing to symptoms in leg.  Will continue to assess for direction of preference. Patient left clinic in no apparent distress and reported no increase in pain during or after session. Patient advised to stop this and any exercise that causes increased pain in LE.        Plan:  Assess response to today's treatment and progress accordingly. May need to assess for lateral component.        Current Problem  1. Lower extremity pain, bilateral            Subjective   General  Patient performed  the lumbar flexion based exercises on Friday in the clinic and Friday night went to a concert and sat for a couple hours.  Reports he experienced severe (\"the worst that I have felt since this happened\") electrical current in left  knee and ankle, nerve pain in left great toe.  Much difficulty sleeping.  Increased symptoms lasted until about Saturday at 4p.  Better on Sunday, then did not experience pain.   Didn't do any of the exercises on Saturday or Sunday.    Currently, reports nerve pain in L 2-3 toes.  Pain  rated 1-2/10, central LBP, denies leg pain at present.  States he is hesitant to exercise due to feeling ok right now and will be playing tennis tonight.  States he is generally better with movement,  bridges and hanging to decompress spine is helpful.      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      Treatments:  Therapeutic Exercise  Therapeutic Exercise Activity 1: Standing R/L side glides 1 x 10 reps (No change in symptoms.)  Therapeutic Exercise Activity 2: Hooklying TA bracing 5\" hold 2 x 10 reps  Therapeutic Exercise Activity 3: Hooklying TA bracing with isometric hip ADD ball squeeze 5\" hold x 15 reps  Therapeutic Exercise Activity 4: Hooklying bridges 3-5\" hold 2 x 10  Therapeutic Exercise Activity 5: Static prone on elbows 2 x 1 min  Therapeutic Exercise Activity 6: Prone on elbows press ups with hips shifts to L to correct alignment 3-5\"  hold 2 x 10 (Mid range lumbar extension)  OP EDUCATION:  Written instructions provided for mid range press ups on elbows, 3 x day 2 x 10 reps. Advised to hold lumbar flexion exercises and end range lumbar extension.        Goals:  Active       PT Problem       PT Goal 1       Start:  10/02/23    Expected End:  12/02/23       Patient will demonstrate independence and compliance with safe and appropriate HEP for ROM, strength, stabilization, balance, pain, edema          PT Goal 2       Start:  10/02/23    Expected End:  12/02/23       Patient will improve outcome measures score of Oswestry to 2 points or less for increased independence and ease with ADL, IADL's, skills and work/leisure activities          PT Goal 3       Start:  10/02/23    Expected End:  12/02/23       Patient will demonstrate full and pain free AROM of lumbar spine to participate in ADL, IADL, work and leisure activities          PT Goal 4       Start:  10/02/23    Expected End:  12/02/23       Patient will be independent with symptoms management in order to decrease pain to 0/10, in order to increase participation with ADL, IADL, work and leisure skills             "

## 2023-10-19 ENCOUNTER — APPOINTMENT (OUTPATIENT)
Dept: PHYSICAL THERAPY | Facility: CLINIC | Age: 68
End: 2023-10-19
Payer: MEDICARE

## 2023-10-19 ENCOUNTER — TREATMENT (OUTPATIENT)
Dept: PHYSICAL THERAPY | Facility: CLINIC | Age: 68
End: 2023-10-19
Payer: MEDICARE

## 2023-10-19 DIAGNOSIS — M79.604 LOWER EXTREMITY PAIN, BILATERAL: Primary | ICD-10-CM

## 2023-10-19 DIAGNOSIS — M79.605 LOWER EXTREMITY PAIN, BILATERAL: Primary | ICD-10-CM

## 2023-10-19 PROCEDURE — 97110 THERAPEUTIC EXERCISES: CPT | Mod: GP

## 2023-10-19 NOTE — PROGRESS NOTES
"Physical Therapy    Physical Therapy Treatment    Patient Name: Austyn Alfaro  MRN: [id number]  Today's Date: 10/19/2023  Time Calculation  Start Time: 1230  Stop Time: 1305  Time Calculation (min): 35 min      Assessment:  Directional preference appears to be lumbar extension and lateral side glide hips to the right in standing.   Over the last several days, patient reported sitting  consistently increases LLE symptoms. Patient tolerated  today's treatment well without increased complaints of lasting LLE symptoms  in toes.  Patient to continue with prone press ups on elbows, standing lateral trunk correction and standing back bends.  Goes to see a spine specialist from [facility] tomorrow .     Plan:  Continue to assess response to treatment and assess for confirmation of direction of preference.        Current Problem  1. Lower extremity pain, bilateral            Subjective   General  Patient reports no pain following last session.  No pain playing tennis until he was almost done and lunged for the ball.  Felt immediate increase in LBP.  Went out with friends afterwards and sat in restaurant,  felt increased pain left thigh to knee.  Woke up the next AM with mod LBP but no leg pain.  Performed mid range press ups, then no leg nerve pain during the day.  Woke up Wednesday with little to no LBP or LE pain. Drove 50 mins to play golf,  increased pain while sitting.  Increased leg pain driving home.   Currently, pulsing \"electrical current\" in left 3-4 toes.  Has appt with spine specialist tomorrow from the [facility].          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     Treatments:  .All exercises performed to  decrease pain, increase ROM, improve flexibility and strength to  better " "perform ADLs and functional mobility activities at home and in community.    Standing lateral side glides hips to R 2 x 10 reps - \"maybe\" decreased intensity of L foot Sx  Standing lateral side glides hips to L 1 x 8 reps - stopped due to increased L foot Sx  Standing lateral side glides hips to R 1 x 10 reps  Instructed in and performed lateral side glides at wall and in doorway, hips to right only.  Educated in and performed standing back bends in neutral and with hips shifted R.  Did better with hips in neutral.    Added the following to HEP:   Access Code: [email or web address]  URL: [email or web address]  Date: 10/19/2023  Prepared by: Liliana Pascual    Exercises  - Standing Lumbar Extension  - 1-3 x daily - 7 x weekly - 2 sets - 10 reps  - Right Standing Lateral Shift Correction at Wall - Repetitions  - 1-3 x daily - 7 x weekly - 2 sets - 10 reps      OP EDUCATION:  Continue with HEP as instructed.  See above. Advised to stop exercises if leg symptoms increase.  Patient verbalizes understanding and agreement.        Goals:  Active       PT Problem       PT Goal 1       Start:  10/02/23    Expected End:  12/02/23       Patient will demonstrate independence and compliance with safe and appropriate HEP for ROM, strength, stabilization, balance, pain, edema          PT Goal 2       Start:  10/02/23    Expected End:  12/02/23       Patient will improve outcome measures score of Oswestry to 2 points or less for increased independence and ease with ADL, IADL's, skills and work/leisure activities          PT Goal 3       Start:  10/02/23    Expected End:  12/02/23       Patient will demonstrate full and pain free AROM of lumbar spine to participate in ADL, IADL, work and leisure activities          PT Goal 4       Start:  10/02/23    Expected End:  12/02/23       Patient will be independent with symptoms management in order to decrease pain to 0/10, in order to increase participation with ADL, IADL, work and " leisure skills

## 2023-10-23 ENCOUNTER — APPOINTMENT (OUTPATIENT)
Dept: PHYSICAL THERAPY | Facility: CLINIC | Age: 68
End: 2023-10-23
Payer: MEDICARE

## 2023-10-26 ENCOUNTER — TREATMENT (OUTPATIENT)
Dept: PHYSICAL THERAPY | Facility: CLINIC | Age: 68
End: 2023-10-26
Payer: MEDICARE

## 2023-10-26 DIAGNOSIS — M79.604 LOWER EXTREMITY PAIN, BILATERAL: ICD-10-CM

## 2023-10-26 DIAGNOSIS — M79.605 LOWER EXTREMITY PAIN, BILATERAL: ICD-10-CM

## 2023-10-26 DIAGNOSIS — M79.604 LOWER EXTREMITY PAIN, BILATERAL: Primary | ICD-10-CM

## 2023-10-26 DIAGNOSIS — M79.605 LOWER EXTREMITY PAIN, BILATERAL: Primary | ICD-10-CM

## 2023-10-26 PROCEDURE — 97530 THERAPEUTIC ACTIVITIES: CPT | Mod: 59,GP | Performed by: PHYSICAL THERAPIST

## 2023-10-26 PROCEDURE — 97110 THERAPEUTIC EXERCISES: CPT | Mod: GP | Performed by: PHYSICAL THERAPIST

## 2023-10-26 NOTE — PROGRESS NOTES
Physical Therapy    Physical Therapy Treatment    Patient Name: Austyn Alfaro  MRN: [id number]  Today's Date: 10/26/2023  Time Calculation  Start Time: 1145  Stop Time: 1219  Time Calculation (min): 34 min      Current Problem  1. Lower extremity pain, bilateral  Follow Up In Physical Therapy           Goals:  Active       PT Problem       PT Goal 1       Start:  10/02/23    Expected End:  12/02/23       Patient will demonstrate independence and compliance with safe and appropriate HEP for ROM, strength, stabilization, balance, pain, edema          PT Goal 2       Start:  10/02/23    Expected End:  12/02/23       Patient will improve outcome measures score of Oswestry to 2 points or less for increased independence and ease with ADL, IADL's, skills and work/leisure activities          PT Goal 3       Start:  10/02/23    Expected End:  12/02/23       Patient will demonstrate full and pain free AROM of lumbar spine to participate in ADL, IADL, work and leisure activities          PT Goal 4       Start:  10/02/23    Expected End:  12/02/23       Patient will be independent with symptoms management in order to decrease pain to 0/10, in order to increase participation with ADL, IADL, work and leisure skills             Assessment:  Directional preference appears to be lumbar extension and lateral side glide hips to the right in standing.   Pt reports that his symptoms have overall been less frequent and intense since his last PT visit last week.  Patient tolerated today's treatment well without increased complaints after today's treatment.  Patient to continue with prone press ups, standing or standing against wall lumbar side glides (hips to right) and standing lumbar extension or lumbar extension leaning against countertop.      Plan:  Continue to assess response to treatment and assess for confirmation of direction of preference.  Continue and progress per PT POC         Subjective   General  Patient reports no pain  following last session.  No increased symptoms playing tennis this past week.  Pt reports that his symptoms have overall been less frequent and intense since his last PT visit last week. He has had intermittent pulsing 'electric current' in left 3,4 toes.      He had a consult with a CNP at the [facility] last week.  Per patient, CNP did not feel that he was a candidate for spine surgery at this time and recommended continuing PT and consulting with pain management. She also prescribed him gabapentin which he has been taking 300 mg before bed. He does not note any change in symptoms since beginning gabapendin.           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     Treatments:  Therapeutic Exercises:  All exercises performed to  decrease pain, increase ROM, improve flexibility and strength to  better perform ADLs and functional mobility activities at home and in community.    Prone lying  Prone lying with hips offset to the right  Prone press ups x 10  Prone press ups with hips offset to the right x 10    Standing lateral side glides hips to R 2 x 10 reps   Standing lateral side glides hips to R (leaning with right UE on wall) x 10 reps   Standing lumbar extension x 10  Standing lateral side glides (hips to right) with lumbar extension x 10      Therapeutic Activity:  Continued Sally lumbar assessment    Standing lumbar movement:  Lumbar extension no effect on symptoms  Lumbar rotation left (upper body to the left) no effect on symptoms  Lumbar rotation right (upper body to the right) no effect on symptoms  Lumbar side glide right (hips to the right)no effect on symptoms  Lumbar side glide left (hips to the left) no effect on symptoms    Repeated lumbar movements:  Lumbar extension no  effect on symptoms  Lumbar rotation left (upper body to the left) produced 'electric current' in left 3,4 toes, worse  Lumbar rotation right (upper body to the right) no effect on symptoms  Lumbar side glide right (hips to the right)no effect on symptoms  Lumbar side glide left (hips to the left) no effect on symptoms  (Pt able to tolerate 2 x 10 reps without change in symptoms compared to experiencing symptoms after 8 reps last PT visit on 10-)      OP EDUCATION:  Continue with HEP as instructed.  Advised to stop exercises if leg symptoms increase.  Patient verbalizes understanding and agreement.     HEP: (see handouts)  Prone press ups  Standing lumbar extension  Lumbar side glide to the right (hips to the right)

## 2023-10-30 ENCOUNTER — APPOINTMENT (OUTPATIENT)
Dept: PHYSICAL THERAPY | Facility: CLINIC | Age: 68
End: 2023-10-30
Payer: MEDICARE

## 2023-11-02 ENCOUNTER — APPOINTMENT (OUTPATIENT)
Dept: PHYSICAL THERAPY | Facility: CLINIC | Age: 68
End: 2023-11-02
Payer: MEDICARE

## 2023-11-06 ENCOUNTER — APPOINTMENT (OUTPATIENT)
Dept: PHYSICAL THERAPY | Facility: CLINIC | Age: 68
End: 2023-11-06
Payer: MEDICARE

## 2023-11-09 ENCOUNTER — TREATMENT (OUTPATIENT)
Dept: PHYSICAL THERAPY | Facility: CLINIC | Age: 68
End: 2023-11-09
Payer: MEDICARE

## 2023-11-09 DIAGNOSIS — M79.604 LOWER EXTREMITY PAIN, BILATERAL: ICD-10-CM

## 2023-11-09 DIAGNOSIS — M79.605 LOWER EXTREMITY PAIN, BILATERAL: ICD-10-CM

## 2023-11-09 PROCEDURE — 97530 THERAPEUTIC ACTIVITIES: CPT | Mod: GP | Performed by: PHYSICAL THERAPIST

## 2023-11-09 NOTE — PROGRESS NOTES
Physical Therapy    Physical Therapy Treatment and Discharge Note    Patient Name: Austyn Alfaro  MRN: [id number]  Today's Date: 11/9/2023  Time Calculation  Start Time: 1145  Stop Time: 1228  Time Calculation (min): 43 min    Current Problem  1. Lower extremity pain, bilateral  Follow Up In Physical Therapy        Goals:  Active       PT Problem       PT Goal 1 (Met)       Start:  10/02/23    Expected End:  12/02/23    Resolved:  11/09/23    Patient will demonstrate independence and compliance with safe and appropriate HEP for ROM, strength, stabilization, balance, pain, edema          PT Goal 2 (Not Progressing)       Start:  10/02/23    Expected End:  12/02/23       Patient will improve outcome measures score of Oswestry to 2 points or less for increased independence and ease with ADL, IADL's, skills and work/leisure activities       Goal Note       No change              PT Goal 3 (Progressing)       Start:  10/02/23    Expected End:  12/02/23       Patient will demonstrate full and pain free AROM of lumbar spine to participate in ADL, IADL, work and leisure activities       Goal Note       Pt able to perform pain free lumbar AROM in all planes for 1 repetition however, repeated Lumbar side glide right (hips to the right), produced left knee pain.              PT Goal 4 (Progressing)       Start:  10/02/23    Expected End:  12/02/23       Patient will be independent with symptoms management in order to decrease pain to 0/10, in order to increase participation with ADL, IADL, work and leisure skills                Assessment:  Re-assessed progress toward goals today as well as [facility] lumbar assessment. Classification of lumbar derangement with a directional preference of lumbar extension.  Pt reports significant improvement in his symptoms since beginning PT and since beginning to take gabapentin daily 600 mg at bedtime.  His symptoms are less frequent and intense since beginning PT.  He requests discharge from  PT after today's visit and feels that he will be able to 'manage' his symptoms on his own in the future.  He is recommended to continue his HEP of either standing lumbar extension or prone lumbar extension x 10 reps every 2-3 hours for 1-2 weeks if the symptoms continue to improve with these exercises. He is to discontinue exercise is he feels that the exercise worsens his symptoms.  He may continue lumbar extension x 10 reps but should decrease frequency to only 2 times per day to maintain symptoms in 2 weeks.    Pt also may use a towel roll for lumbar support in supine and sidelying as tolerated.  Pt verbalized understanding.  Pt declined need for handouts.      Patient tolerated today's PT session well without increased complaints after today's session.        Plan:  Discharged with HEP    Subjective   Pt reports 0/10 pain upon arrival today. He states that he is feeling 'better' since his last PT visit.   Patient reports no increased pain following last session.  No increased symptoms with activities such as playing tennis.  Pt reports that his symptoms have overall been less frequent and intense since his last PT visit.  He is able to sit longer periods without symptoms.  He reports that he gets radicular LE symptoms with L/R sidelying but symptoms abolish immediately when he changes position to supine.  He requests discharge from PT after today's visit and feels that he will be able to 'manage' his symptoms on his own in the future.   He has a follow up with a CNP at the [facility] next week.       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     Standing lumbar movement:  Lumbar  extension no effect on symptoms  Lumbar rotation left (upper body to the left) no effect on symptoms  Lumbar rotation right (upper body to the right) no effect on symptoms  Lumbar side glide right (hips to the right)no effect on symptoms  Lumbar side glide left (hips to the left) no effect on symptoms    Repeated standing lumbar movements:  Lumbar extension no effect on symptoms  Lumbar rotation left (upper body to the left) no effect on symptoms  Lumbar rotation right (upper body to the right) no effect on symptoms  Lumbar side glide right (hips to the right), produced left knee pain, worse  Lumbar side glide left (hips to the left) no effect on symptoms    Supine:  Modified lumbar flexion (ball under lower legs for support and hip flexion no more than 90 degrees) no effect on symptoms  Repeated Modified lumbar flexion (ball under lower legs for support and hip flexion no more than 90 degrees) no effect on symptoms     Prone:  Lumbar EIL no effect on symptoms  Repeated Lumbar EIL no effect on symptoms    Myotomes:  Pt reports no weakness in lower extremities today      Outcome Measure  Oswestry = 13 points    OP EDUCATION:  Continue with HEP as  tolerated.  Discontinue exercise if symptoms are produced or worsen. Perform 10 reps of either standing lumbar extension or prone lumbar extension (not both) 2 times per day to maintain benefits of exercise and PT.   Patient verbalizes understanding and agreement.     HEP:   Prone press ups  Standing lumbar extension

## 2023-11-13 ENCOUNTER — APPOINTMENT (OUTPATIENT)
Dept: HEMATOLOGY/ONCOLOGY | Facility: CLINIC | Age: 68
End: 2023-11-13
Payer: MEDICARE

## 2023-11-13 ASSESSMENT — PATIENT HEALTH QUESTIONNAIRE - PHQ9
2. FEELING DOWN, DEPRESSED, IRRITABLE, OR HOPELESS: 0
1. LITTLE INTEREST OR PLEASURE IN DOING THINGS: 0
8. MOVING OR SPEAKING SO SLOWLY THAT OTHER PEOPLE COULD HAVE NOTICED. OR THE OPPOSITE, BEING SO FIGETY OR RESTLESS THAT YOU HAVE BEEN MOVING AROUND A LOT MORE THAN USUAL: NOT AT ALL
3. TROUBLE FALLING OR STAYING ASLEEP OR SLEEPING TOO MUCH: 2
3. TROUBLE FALLING OR STAYING ASLEEP OR SLEEPING TOO MUCH: MORE THAN HALF THE DAYS
1. LITTLE INTEREST OR PLEASURE IN DOING THINGS: NOT AT ALL
SUM OF ALL RESPONSES TO PHQ QUESTIONS 1-9: 2
8. MOVING OR SPEAKING SO SLOWLY THAT OTHER PEOPLE COULD HAVE NOTICED. OR THE OPPOSITE, BEING SO FIGETY OR RESTLESS THAT YOU HAVE BEEN MOVING AROUND A LOT MORE THAN USUAL: 0
7. TROUBLE CONCENTRATING ON THINGS, SUCH AS READING THE NEWSPAPER OR WATCHING TELEVISION: NOT AT ALL
2. FEELING DOWN, DEPRESSED OR HOPELESS: NOT AT ALL
10. IF YOU CHECKED OFF ANY PROBLEMS, HOW DIFFICULT HAVE THESE PROBLEMS MADE IT FOR YOU TO DO YOUR WORK, TAKE CARE OF THINGS AT HOME, OR GET ALONG WITH OTHER PEOPLE: NOT DIFFICULT AT ALL
4. FEELING TIRED OR HAVING LITTLE ENERGY: NOT AT ALL
4. FEELING TIRED OR HAVING LITTLE ENERGY: 0
5. POOR APPETITE OR OVEREATING: NOT AT ALL
8. MOVING OR SPEAKING SO SLOWLY THAT OTHER PEOPLE COULD HAVE NOTICED. OR THE OPPOSITE, BEING SO FIGETY OR RESTLESS THAT YOU HAVE BEEN MOVING AROUND A LOT MORE THAN USUAL: NOT AT ALL
6. FEELING BAD ABOUT YOURSELF - OR THAT YOU ARE A FAILURE OR HAVE LET YOURSELF OR YOUR FAMILY DOWN: NOT AT ALL
2. FEELING DOWN, DEPRESSED, IRRITABLE, OR HOPELESS: NOT AT ALL
9. THOUGHTS THAT YOU WOULD BE BETTER OFF DEAD, OR OF HURTING YOURSELF: NOT AT ALL
3. TROUBLE FALLING OR STAYING ASLEEP OR SLEEPING TOO MUCH: MORE THAN HALF THE DAYS
7. TROUBLE CONCENTRATING ON THINGS, SUCH AS READING THE NEWSPAPER OR WATCHING TELEVISION: 0
9. THOUGHTS THAT YOU WOULD BE BETTER OFF DEAD, OR OF HURTING YOURSELF: 0
6. FEELING BAD ABOUT YOURSELF - OR THAT YOU ARE A FAILURE OR HAVE LET YOURSELF OR YOUR FAMILY DOWN: NOT AT ALL
4. FEELING TIRED OR HAVING LITTLE ENERGY: NOT AT ALL
1. LITTLE INTEREST OR PLEASURE IN DOING THINGS: NOT AT ALL
7. TROUBLE CONCENTRATING ON THINGS, SUCH AS READING THE NEWSPAPER OR WATCHING TELEVISION: NOT AT ALL
10. IF YOU CHECKED OFF ANY PROBLEMS, HOW DIFFICULT HAVE THESE PROBLEMS MADE IT FOR YOU TO DO YOUR WORK, TAKE CARE OF THINGS AT HOME, OR GET ALONG WITH OTHER PEOPLE: NOT DIFFICULT AT ALL
5. POOR APPETITE OR OVEREATING: 0
9. THOUGHTS THAT YOU WOULD BE BETTER OFF DEAD, OR OF HURTING YOURSELF: NOT AT ALL
SUM OF ALL RESPONSES TO PHQ QUESTIONS 1-9: 2
6. FEELING BAD ABOUT YOURSELF - OR THAT YOU ARE A FAILURE OR HAVE LET YOURSELF OR YOUR FAMILY DOWN: 0
5. POOR APPETITE OR OVEREATING: NOT AT ALL

## 2023-12-14 ENCOUNTER — HOSPITAL ENCOUNTER (OUTPATIENT)
Dept: RADIOLOGY | Facility: HOSPITAL | Age: 68
Discharge: HOME | End: 2023-12-14
Payer: MEDICARE

## 2023-12-14 DIAGNOSIS — E78.00 PURE HYPERCHOLESTEROLEMIA: ICD-10-CM

## 2023-12-14 PROCEDURE — 75571 CT HRT W/O DYE W/CA TEST: CPT

## 2023-12-18 ENCOUNTER — HOSPITAL ENCOUNTER (OUTPATIENT)
Dept: RADIOLOGY | Facility: HOSPITAL | Age: 68
Discharge: HOME | End: 2023-12-18
Payer: MEDICARE

## 2023-12-18 ENCOUNTER — CLINICAL SUPPORT (OUTPATIENT)
Dept: ORTHOPEDIC SURGERY | Facility: CLINIC | Age: 68
End: 2023-12-18
Payer: MEDICARE

## 2023-12-18 DIAGNOSIS — S99.922D FOOT INJURY, LEFT, SUBSEQUENT ENCOUNTER: ICD-10-CM

## 2023-12-18 DIAGNOSIS — S99.922D FOOT INJURY, LEFT, SUBSEQUENT ENCOUNTER: Primary | ICD-10-CM

## 2023-12-18 DIAGNOSIS — M76.72 PERONEAL TENDINITIS OF LEFT LOWER EXTREMITY: ICD-10-CM

## 2023-12-18 DIAGNOSIS — M19.079 ANKLE ARTHRITIS: ICD-10-CM

## 2023-12-18 PROCEDURE — 99203 OFFICE O/P NEW LOW 30 MIN: CPT

## 2023-12-18 PROCEDURE — 73700 CT LOWER EXTREMITY W/O DYE: CPT | Mod: LT

## 2023-12-18 PROCEDURE — 73630 X-RAY EXAM OF FOOT: CPT | Mod: LT,FY

## 2023-12-18 PROCEDURE — 73700 CT LOWER EXTREMITY W/O DYE: CPT | Mod: LEFT SIDE | Performed by: RADIOLOGY

## 2023-12-18 PROCEDURE — 73630 X-RAY EXAM OF FOOT: CPT | Mod: LEFT SIDE | Performed by: RADIOLOGY

## 2023-12-18 RX ORDER — GABAPENTIN 400 MG/1
400 CAPSULE ORAL NIGHTLY
COMMUNITY

## 2023-12-18 NOTE — PROGRESS NOTES
HPI  Austyn Alfaro is a 68 y.o. male  in office today for   Chief Complaint   Patient presents with    Left Foot - Pain     Pt has had pain x1 week- He states a week ago a frozen item fell out of his freezer and hit the top of his foot.   .  he is unsure if that is the cause of the pain or if he possibly did something else to the foot.  He is also a  and regularly plays tennis.  Arrived using crutches, states it has felt better with nonweightbearing the last few days.  Has a past history of surgery and sprains in this ankle many years ago      Medication  Current Outpatient Medications on File Prior to Visit   Medication Sig Dispense Refill    gabapentin (Neurontin) 400 mg capsule Take 1 capsule (400 mg) by mouth once daily at bedtime.      tamsulosin (Flomax) 0.4 mg 24 hr capsule Take 1 capsule (0.4 mg) by mouth once daily in the morning. Take before meals.       No current facility-administered medications on file prior to visit.       Physical Exam  Constitutional: well developed, well nourished male in no acute distress  Psychiatric: normal mood, appropriate affect  Eyes: sclera anicteric  HENT: normocephalic/atraumatic  CV: regular rate and rhythm   Respiratory: non labored breathing  Integumentary: no rash  Neurological: moves all extremities    Left Ankle Exam     Tenderness   Left ankle tenderness location: lateral side of foot.   Swelling: moderate    Range of Motion   Dorsiflexion:  normal   Plantar flexion:  normal   Eversion:  normal   Inversion:  normal               Imaging/Lab:  X-rays were taken today which were reviewed by myself and read by myself and show no obvious bony abnormality or dislocation      Assessment  Assessment: Left foot pain, left ankle tendonitis, left ankle arthritis    Plan  Plan:  History, physical exam, and imaging were reviewed with patient. Ordering CT to assess for a stress fracture of the foot, do not see anything on x-ray but definite pain and swelling over  lateral foot and ankle.  He should continue to keep weight off the foot, boot if needed, RICE and antiinflammatories.  Follow Up: Will follow up with CT results.    All questions were answered for the patient prior to end of exam and patient addressed their understanding.    Kelly Wiseman PA-C  12/18/23    Addendum:  Called and discussed results with patient after CT was resulted.  Results below.  Given pattern of pain, arthritis and tendonitis most likely contributing factors. RICE and antiinflammatories as discussed above is still applicable.  Also discussed PT as another option.  He is going to continue with RICE and antiinflammatories for the time being.    CT results were reviewed by myself and read by radiology and show corticate bone fragments compatible with prior avulsion fractures.  Finding compatible with degree of Achilles tendinosis and peroneus longus tendinosis.  Nonspecific soft tissue swelling over the lateral ankle extending onto the lateral dorsal foot without focal collection.  Degenerative changes in a number of joints of the foot and ankle.

## 2024-01-08 DIAGNOSIS — M76.72 PERONEAL TENDINITIS OF LEFT LOWER EXTREMITY: Primary | ICD-10-CM

## 2024-01-09 ENCOUNTER — HOSPITAL ENCOUNTER (EMERGENCY)
Facility: HOSPITAL | Age: 69
Discharge: OTHER INSTITUTION | End: 2024-01-09
Payer: MEDICARE

## 2024-01-09 ENCOUNTER — HOSPITAL ENCOUNTER (OUTPATIENT)
Dept: RADIOLOGY | Facility: HOSPITAL | Age: 69
Discharge: HOME | End: 2024-01-09
Payer: MEDICARE

## 2024-01-09 DIAGNOSIS — M76.72 PERONEAL TENDINITIS OF LEFT LOWER EXTREMITY: ICD-10-CM

## 2024-01-09 PROCEDURE — 4500999001 HC ED NO CHARGE

## 2024-01-10 DIAGNOSIS — M79.604 LOWER EXTREMITY PAIN, BILATERAL: Primary | ICD-10-CM

## 2024-01-10 DIAGNOSIS — M79.605 LOWER EXTREMITY PAIN, BILATERAL: Primary | ICD-10-CM

## 2024-01-24 ENCOUNTER — APPOINTMENT (OUTPATIENT)
Dept: OPERATING ROOM | Facility: HOSPITAL | Age: 69
End: 2024-01-24
Payer: MEDICARE

## 2024-01-25 ENCOUNTER — APPOINTMENT (OUTPATIENT)
Dept: RADIOLOGY | Facility: HOSPITAL | Age: 69
End: 2024-01-25
Payer: MEDICARE

## 2024-02-09 ENCOUNTER — TELEPHONE (OUTPATIENT)
Dept: ORTHOPEDIC SURGERY | Facility: CLINIC | Age: 69
End: 2024-02-09
Payer: MEDICARE

## 2024-02-09 NOTE — TELEPHONE ENCOUNTER
1/10/24 LT FOOT, LT ANKLE INJURY  [facility] ordered LT FOOT & LT ANKLE MRI. Radiology canceled these orders because the patient wasn't ready to schedule that day. He wants to see if these can be re-ordered?

## 2024-02-12 DIAGNOSIS — S99.922D FOOT INJURY, LEFT, SUBSEQUENT ENCOUNTER: ICD-10-CM

## 2024-02-12 DIAGNOSIS — M25.572 CHRONIC PAIN OF LEFT ANKLE: Primary | ICD-10-CM

## 2024-02-12 DIAGNOSIS — G89.29 CHRONIC PAIN OF LEFT ANKLE: Primary | ICD-10-CM

## 2024-02-27 DIAGNOSIS — S86.302A PERONEAL TENDON INJURY, LEFT, INITIAL ENCOUNTER: ICD-10-CM

## 2024-02-28 ENCOUNTER — HOSPITAL ENCOUNTER (OUTPATIENT)
Dept: RADIOLOGY | Facility: HOSPITAL | Age: 69
Discharge: HOME | End: 2024-02-28
Payer: MEDICARE

## 2024-02-28 DIAGNOSIS — G89.29 CHRONIC PAIN OF LEFT ANKLE: ICD-10-CM

## 2024-02-28 DIAGNOSIS — S99.922D FOOT INJURY, LEFT, SUBSEQUENT ENCOUNTER: ICD-10-CM

## 2024-02-28 DIAGNOSIS — M25.572 CHRONIC PAIN OF LEFT ANKLE: ICD-10-CM

## 2024-02-28 PROCEDURE — 73721 MRI JNT OF LWR EXTRE W/O DYE: CPT | Mod: LT

## 2024-02-28 PROCEDURE — 73721 MRI JNT OF LWR EXTRE W/O DYE: CPT | Mod: LEFT SIDE | Performed by: STUDENT IN AN ORGANIZED HEALTH CARE EDUCATION/TRAINING PROGRAM

## 2024-03-01 ENCOUNTER — OFFICE VISIT (OUTPATIENT)
Dept: ORTHOPEDIC SURGERY | Facility: CLINIC | Age: 69
End: 2024-03-01
Payer: MEDICARE

## 2024-03-01 ENCOUNTER — HOSPITAL ENCOUNTER (OUTPATIENT)
Dept: RADIOLOGY | Facility: CLINIC | Age: 69
Discharge: HOME | End: 2024-03-01
Payer: MEDICARE

## 2024-03-01 DIAGNOSIS — M21.172 ACQUIRED VARUS DEFORMITY OF LEFT ANKLE: ICD-10-CM

## 2024-03-01 DIAGNOSIS — M76.72 PERONEAL TENDINITIS OF LEFT LOWER EXTREMITY: ICD-10-CM

## 2024-03-01 DIAGNOSIS — S99.922D FOOT INJURY, LEFT, SUBSEQUENT ENCOUNTER: ICD-10-CM

## 2024-03-01 DIAGNOSIS — M19.072 ARTHRITIS OF LEFT ANKLE: Primary | ICD-10-CM

## 2024-03-01 DIAGNOSIS — M19.072 ARTHRITIS OF LEFT SUBTALAR JOINT: ICD-10-CM

## 2024-03-01 PROCEDURE — 1126F AMNT PAIN NOTED NONE PRSNT: CPT | Performed by: STUDENT IN AN ORGANIZED HEALTH CARE EDUCATION/TRAINING PROGRAM

## 2024-03-01 PROCEDURE — 1159F MED LIST DOCD IN RCRD: CPT | Performed by: STUDENT IN AN ORGANIZED HEALTH CARE EDUCATION/TRAINING PROGRAM

## 2024-03-01 PROCEDURE — 1036F TOBACCO NON-USER: CPT | Performed by: STUDENT IN AN ORGANIZED HEALTH CARE EDUCATION/TRAINING PROGRAM

## 2024-03-01 PROCEDURE — 73630 X-RAY EXAM OF FOOT: CPT | Mod: LT

## 2024-03-01 PROCEDURE — 73630 X-RAY EXAM OF FOOT: CPT | Mod: LEFT SIDE | Performed by: RADIOLOGY

## 2024-03-01 PROCEDURE — 73610 X-RAY EXAM OF ANKLE: CPT | Mod: LEFT SIDE | Performed by: RADIOLOGY

## 2024-03-01 PROCEDURE — 99213 OFFICE O/P EST LOW 20 MIN: CPT | Performed by: STUDENT IN AN ORGANIZED HEALTH CARE EDUCATION/TRAINING PROGRAM

## 2024-03-01 PROCEDURE — 73610 X-RAY EXAM OF ANKLE: CPT | Mod: LT

## 2024-03-01 ASSESSMENT — PAIN - FUNCTIONAL ASSESSMENT: PAIN_FUNCTIONAL_ASSESSMENT: NO/DENIES PAIN

## 2024-03-01 ASSESSMENT — PAIN SCALES - GENERAL: PAINLEVEL_OUTOF10: 0 - NO PAIN

## 2024-03-01 NOTE — PROGRESS NOTES
ORTHOPAEDIC SURGERY HISTORY & PHYSICAL     Chief Complaint:  Left foot pain    History Of Present Illness  Austyn Alfaro is a 68 y.o. male who presents for evaluation of left foot pain.  Patient was previously a Medicare claims .  He sustained an injury to his foot on 12/11/2023.  He was able to continue playing tennis, approximately 2 hours following his injury.  He had significant ankle swelling following the injury.  He has had an extensive radiographic workup including x-ray, CT and MRI.  He rest and elevate his foot for several weeks following initial injury but noticed a return of his pain.  He has been able to wean from crutches to a cane and then to no assistive devices and recently underwent an MRI.  He has recently returned from a trip to [facility].  Patient reports as long as he is on a flat surface walking straight ahead that he has minimal pain.  He continues to have difficulty with cutting activities.    Currently the patient reports no pain.  He has returned to 95% of normal.  He has attempted to return to tennis but still notices that cutting his bothersome to him.  He reports a remote history of numerous ankle sprains and has had what sounds like a previous anterior impingement lesion excision.  He denies any associated numbness, tingling or weakness.    Past Medical History  Past Medical History:   Diagnosis Date    Arthritis Sep 2013    Cancer (CMS/[facility]) 2023    Cough 07/06/2023    Herpes zoster subepithelial infiltrates 05/31/2023    History of right hip replacement 09/24/2021    Paroxysmal atrial fibrillation (CMS/[facility]) 07/06/2023    Pitting of nails 09/24/2021    Superficial keratitis of left eye 05/26/2023       Surgical History  Recent Surgeries in Orthopaedic Surgery            No cases to display             Social History  Social History     Socioeconomic History    Marital status:      Spouse name: None    Number of children: None    Years of education: None    Highest  education level: None   Occupational History    Occupation: Retired   Tobacco Use    Smoking status: Never     Passive exposure: Never    Smokeless tobacco: Never   Vaping Use    Vaping Use: Never used   Substance and Sexual Activity    Alcohol use: Not Currently    Drug use: Never    Sexual activity: Not Currently     Partners: Female   Other Topics Concern    None   Social History Narrative    None     Social Determinants of Health     Financial Resource Strain: Not on file   Food Insecurity: Not on file   Transportation Needs: Not on file   Physical Activity: Not on file   Stress: Not on file   Social Connections: Not on file   Intimate Partner Violence: Not on file   Housing Stability: Not on file       Family History  Family History   Problem Relation Name Age of Onset    No Known Problems Mother      No Known Problems Father      No Known Problems Other child         Allergies  No Known Allergies    Review of Systems  REVIEW OF SYSTEMS  Constitutional: no unplanned weight loss  Psychiatric: no suicidal ideation  ENT: no vision changes, no sinus problems  Pulmonary: no shortness of breath  Lymphatic: no enlarged lymph nodes  Cardiovascular: no chest pain or shortness of breath  Gastrointestinal: no stomach problems  Genitourinary: no dysuria   Skin: no rashes  Endocrine: no thyroid problems  Neurological: no headache, no numbness  Hematological: no easy bruising  Musculoskeletal: Left leg pain    Physical Exam  PHYSICAL EXAMINATION  Constitutional Exam: well developed and well nourished  Psychiatric Exam: alert and oriented, appropriate mood and behavior  Eye Exam: EOMI  Pulmonary Exam: breathing non-labored, no apparent distress  Lymphatic exam: no appreciable lymphadenopathy in the lower extremities  Cardiovascular exam: RRR to peripheral palpation, DP pulses 2+, PT 2+, toes are pink with good capillary refill, no pitting edema  Skin exam: no open lesions, rashes, abrasions or ulcerations  Neurological exam:  sensation to light touch intact in both lower extremities in peripheral and dermatomal distributions (except for any abnormalities noted in musculoskeletal exam)    Musculoskeletal exam: Left lower extremity examination.  Patient pain previously localized to the posterior lateral ankle.  He is focally nontender to palpation at the peroneal tendons with minimal tenderness to palpation overlying the sinus Tarsi on examination today.  Patient also without significant tenderness to palpation overlying the anterior ankle including the medial malleolus.  Patient has had several years of increased violaceous hue to his distal toes.  Patient has hindfoot varus alignment with peekaboo heel and cavus foot posture with 1 through 5 claw toe deformities.  He has somewhat restricted but painless ankle and subtalar joint range of motion.  Patient has sensation intact light touch grossly in the saphenous, sural, superficial peroneal, deep peroneal and tibial nerve distribution.  He has intact plantarflexion, dorsiflexion and EHL with inversion and slightly weakened eversion.  He has 2+ DP/PT pulse palpated.    Last Recorded Vitals  There were no vitals taken for this visit.    Laboratory Results    No results found for this or any previous visit (from the past 24 hour(s)).    Radiology Results   X-ray imaging 3 view weightbearing left foot and ankle reviewed from 03/01/2024 and independently evaluated by me demonstrates no acute fracture or dislocation.  There is relative varus tibiotalar wear with distal medial gutter osteophytosis as well as relatively anterior subluxation of the talus and decreased joint space posterior facet of the subtalar joint.  Incidental note of calcaneal enthesophyte and lateral projection of foot x-ray demonstrates cavus posture with obvious metatarsal stacking.    MRI left ankle reviewed from 02/20/2024 and independently evaluated by me demonstrates periarticular medial malleolus cysts with tibiotalar  and subtalar osteoarthritis as well as chronic appearing changes of the lateral ligament complex and complex PL tear with obvious tendinosis.    Assessment/Plan:  68-year-old male who my impression has left peroneal tendon injury in the setting of chronic ankle sprains and cavus foot posture with varus ankle alignment and obvious tibiotalar and subtalar joint osteoarthritis.  I have reviewed the diagnosis and treatment options extensively with the patient.  In my impression the patient may continue weightbearing as tolerated in his left lower extremity.  I reviewed with the patient that based on his symptom level and ankle alignment that I would not recommend isolated treatment of his peroneal tendon injury as this would likely recur.  I will provide him with a prescription for a custom [place] brace and discussed that from a surgery perspective that if symptoms warranted that he may ultimately consider a left TT+ST+DTF arthrodesis  likely with TTC IMN and associated bony and soft tissue realignment procedures to achieve a plantigrade foot or staged left ST arthrodesis followed by left TAR with associated bony and soft tissue realignment procedures to achieve a plantigrade foot.  I will plan to see the patient back in approximately 3 months to monitor his clinical course.  Upon return, patient would not require further imaging.    Mark Otoole MD, DELICIA  Department of Orthopaedic Surgery  [facility]    The diagnosis and treatment plan were reviewed with the patient. All questions were answered. The patient verbalized understanding of the treatment plan. There were no barriers to understanding identified.    Note dictated with [employer] software.  Completed without full type editing and sent to avoid delay.

## 2024-03-01 NOTE — PATIENT INSTRUCTIONS
[facility]: [phone]    [facility]: [phone]    [facility]: [phone]    [facility], [place]:  [phone]

## 2024-03-22 ENCOUNTER — TELEPHONE (OUTPATIENT)
Dept: PREADMISSION TESTING | Facility: HOSPITAL | Age: 69
End: 2024-03-22

## 2024-03-26 ENCOUNTER — ANESTHESIA EVENT (OUTPATIENT)
Dept: OPERATING ROOM | Facility: HOSPITAL | Age: 69
End: 2024-03-26
Payer: MEDICARE

## 2024-03-27 ENCOUNTER — HOSPITAL ENCOUNTER (OUTPATIENT)
Dept: OPERATING ROOM | Facility: HOSPITAL | Age: 69
Setting detail: OUTPATIENT SURGERY
Discharge: HOME | End: 2024-03-27
Payer: MEDICARE

## 2024-03-27 ENCOUNTER — ANESTHESIA (OUTPATIENT)
Dept: OPERATING ROOM | Facility: HOSPITAL | Age: 69
End: 2024-03-27
Payer: MEDICARE

## 2024-03-27 VITALS
HEART RATE: 72 BPM | SYSTOLIC BLOOD PRESSURE: 119 MMHG | HEIGHT: 74 IN | OXYGEN SATURATION: 97 % | RESPIRATION RATE: 15 BRPM | WEIGHT: 200.62 LBS | BODY MASS INDEX: 25.75 KG/M2 | TEMPERATURE: 97.7 F | DIASTOLIC BLOOD PRESSURE: 65 MMHG

## 2024-03-27 DIAGNOSIS — Z12.11 COLON CANCER SCREENING: ICD-10-CM

## 2024-03-27 PROCEDURE — 3700000001 HC GENERAL ANESTHESIA TIME - INITIAL BASE CHARGE: Performed by: NURSE ANESTHETIST, CERTIFIED REGISTERED

## 2024-03-27 PROCEDURE — G0121 COLON CA SCRN NOT HI RSK IND: HCPCS | Performed by: SURGERY

## 2024-03-27 PROCEDURE — 2500000004 HC RX 250 GENERAL PHARMACY W/ HCPCS (ALT 636 FOR OP/ED): Performed by: ANESTHESIOLOGY

## 2024-03-27 PROCEDURE — A45378 PR COLONOSCOPY,DIAGNOSTIC: Performed by: NURSE ANESTHETIST, CERTIFIED REGISTERED

## 2024-03-27 PROCEDURE — 2500000004 HC RX 250 GENERAL PHARMACY W/ HCPCS (ALT 636 FOR OP/ED): Performed by: NURSE ANESTHETIST, CERTIFIED REGISTERED

## 2024-03-27 PROCEDURE — 3700000002 HC GENERAL ANESTHESIA TIME - EACH INCREMENTAL 1 MINUTE: Performed by: NURSE ANESTHETIST, CERTIFIED REGISTERED

## 2024-03-27 PROCEDURE — 3600000002 HC OR TIME - INITIAL BASE CHARGE - PROCEDURE LEVEL TWO: Performed by: NURSE ANESTHETIST, CERTIFIED REGISTERED

## 2024-03-27 PROCEDURE — 7100000010 HC PHASE TWO TIME - EACH INCREMENTAL 1 MINUTE: Performed by: NURSE ANESTHETIST, CERTIFIED REGISTERED

## 2024-03-27 PROCEDURE — 2500000005 HC RX 250 GENERAL PHARMACY W/O HCPCS: Performed by: NURSE ANESTHETIST, CERTIFIED REGISTERED

## 2024-03-27 PROCEDURE — 3600000007 HC OR TIME - EACH INCREMENTAL 1 MINUTE - PROCEDURE LEVEL TWO: Performed by: NURSE ANESTHETIST, CERTIFIED REGISTERED

## 2024-03-27 PROCEDURE — 7100000009 HC PHASE TWO TIME - INITIAL BASE CHARGE: Performed by: NURSE ANESTHETIST, CERTIFIED REGISTERED

## 2024-03-27 RX ORDER — PROPOFOL 10 MG/ML
INJECTION, EMULSION INTRAVENOUS AS NEEDED
Status: DISCONTINUED | OUTPATIENT
Start: 2024-03-27 | End: 2024-03-27

## 2024-03-27 RX ORDER — FENTANYL CITRATE 50 UG/ML
INJECTION, SOLUTION INTRAMUSCULAR; INTRAVENOUS AS NEEDED
Status: DISCONTINUED | OUTPATIENT
Start: 2024-03-27 | End: 2024-03-27

## 2024-03-27 RX ORDER — MIDAZOLAM HYDROCHLORIDE 1 MG/ML
INJECTION INTRAMUSCULAR; INTRAVENOUS AS NEEDED
Status: DISCONTINUED | OUTPATIENT
Start: 2024-03-27 | End: 2024-03-27

## 2024-03-27 RX ORDER — SODIUM CHLORIDE, SODIUM LACTATE, POTASSIUM CHLORIDE, CALCIUM CHLORIDE 600; 310; 30; 20 MG/100ML; MG/100ML; MG/100ML; MG/100ML
100 INJECTION, SOLUTION INTRAVENOUS CONTINUOUS
Status: DISCONTINUED | OUTPATIENT
Start: 2024-03-27 | End: 2024-03-28 | Stop reason: HOSPADM

## 2024-03-27 RX ORDER — LIDOCAINE HCL/PF 100 MG/5ML
SYRINGE (ML) INTRAVENOUS AS NEEDED
Status: DISCONTINUED | OUTPATIENT
Start: 2024-03-27 | End: 2024-03-27

## 2024-03-27 RX ADMIN — PROPOFOL 20 MG: 10 INJECTION, EMULSION INTRAVENOUS at 10:06

## 2024-03-27 RX ADMIN — PROPOFOL 50 MG: 10 INJECTION, EMULSION INTRAVENOUS at 09:44

## 2024-03-27 RX ADMIN — PROPOFOL 20 MG: 10 INJECTION, EMULSION INTRAVENOUS at 09:58

## 2024-03-27 RX ADMIN — LIDOCAINE HYDROCHLORIDE 60 MG: 20 INJECTION INTRAVENOUS at 09:44

## 2024-03-27 RX ADMIN — PROPOFOL 20 MG: 10 INJECTION, EMULSION INTRAVENOUS at 09:50

## 2024-03-27 RX ADMIN — MIDAZOLAM HYDROCHLORIDE 2 MG: 1 INJECTION, SOLUTION INTRAMUSCULAR; INTRAVENOUS at 09:42

## 2024-03-27 RX ADMIN — SODIUM CHLORIDE, POTASSIUM CHLORIDE, SODIUM LACTATE AND CALCIUM CHLORIDE 100 ML/HR: 600; 310; 30; 20 INJECTION, SOLUTION INTRAVENOUS at 09:36

## 2024-03-27 RX ADMIN — PROPOFOL 30 MG: 10 INJECTION, EMULSION INTRAVENOUS at 10:02

## 2024-03-27 RX ADMIN — PROPOFOL 30 MG: 10 INJECTION, EMULSION INTRAVENOUS at 09:54

## 2024-03-27 RX ADMIN — FENTANYL CITRATE 50 MCG: 50 INJECTION, SOLUTION INTRAMUSCULAR; INTRAVENOUS at 09:43

## 2024-03-27 RX ADMIN — PROPOFOL 30 MG: 10 INJECTION, EMULSION INTRAVENOUS at 09:47

## 2024-03-27 ASSESSMENT — PAIN SCALES - GENERAL
PAINLEVEL_OUTOF10: 0 - NO PAIN

## 2024-03-27 ASSESSMENT — PAIN - FUNCTIONAL ASSESSMENT
PAIN_FUNCTIONAL_ASSESSMENT: 0-10

## 2024-03-27 NOTE — ANESTHESIA PREPROCEDURE EVALUATION
Patient: Austyn Alfaro    Procedure Information       Anesthesia Start Date/Time: 03/27/24 0941    Scheduled providers: Tobin Walters MD    Procedure: COLONOSCOPY    Location: [facility] OR            Relevant Problems   Cardiac   (+) Pure hypercholesterolemia      Musculoskeletal   (+) Osteoarthritis   (+) Scoliosis      Skin   (+) Squamous cell carcinoma of skin of right lower limb, including hip       Clinical information reviewed:   Tobacco  Allergies  Meds   Med Hx  Surg Hx   Fam Hx  Soc Hx        NPO Detail:  NPO/Void Status  Carbohydrate Drink Given Prior to Surgery? : N  Date of Last Liquid: 03/26/24  Time of Last Liquid: 2300  Date of Last Solid: 03/25/24  Time of Last Solid: 2300  Last Intake Type: Clear fluids  Time of Last Void: 0830         Physical Exam    Airway  Mallampati: II     Cardiovascular   Rhythm: regular     Dental        Pulmonary    Abdominal            Anesthesia Plan    History of general anesthesia?: yes  History of complications of general anesthesia?: no    ASA 2     MAC     Anesthetic plan and risks discussed with patient.

## 2024-03-27 NOTE — H&P
"History Of Present Illness  Austyn Alfaro is a 68 y.o. male presenting with   For screening colonoscopy   Last colonoscopy at age of 51 with ccf. Normal per patient.  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 He has never been exposed to tobacco smoke. He has never used smokeless tobacco. He reports that he does not currently use alcohol. He reports that he does not use drugs.    Family History  Family History   Problem Relation Name Age of Onset    No Known Problems Mother      No Known Problems Father      No Known Problems Other child         Allergies  Patient has no known allergies.    Review of Systems   All other systems reviewed and are negative.       Physical Exam  Cardiovascular:      Rate and Rhythm: Normal rate.   Pulmonary:      Effort: Pulmonary effort is normal.   Abdominal:      Palpations: Abdomen is soft.          Last Recorded Vitals  Blood pressure 138/87, pulse 71, temperature 36.1 °C (97 °F), height 1.88 m (6' 2\"), weight 91 kg (200 lb 9.9 oz), SpO2 100 %.    Relevant Results              Assessment/Plan   Active Problems:  There are no active Hospital Problems.    Scheduled for screening colonoscopy   "       Tobin Walters MD

## 2024-03-27 NOTE — DISCHARGE INSTRUCTIONS
Patient Instructions after a Colonoscopy      The anesthetics, sedatives or narcotics which were given to you today will be acting in your body for the next 24 hours, so you might feel a little sleepy or groggy.  This feeling should slowly wear off. Carefully read and follow the instructions.     You received sedation today:  - Do not drive or operate any machinery or power tools of any kind.   - No alcoholic beverages today, not even beer or wine.  - Do not make any important decisions or sign any legal documents.  - No over the counter medications that contain alcohol or that may cause drowsiness.  - Do not make any important decisions or sign any legal documents.    While it is common to experience mild to moderate abdominal distention, gas, or belching after your procedure, if any of these symptoms occur following discharge from the GI Lab or within one week of having your procedure, call the [facility] to be advised whether a visit to your nearest Urgent Care or Emergency Department is indicated.  Take this paper with you if you go.     - If you develop an allergic reaction to the medications that were given during your procedure such as difficulty breathing, rash, hives, severe nausea, vomiting or lightheadedness.  - If you experience chest pain, shortness of breath, severe abdominal pain, fevers and chills.  -If you develop signs and symptoms of bleeding such as blood in your spit, if your stools turn black, tarry, or bloody  - If you have not urinated within 8 hours following your procedure.  - If your IV site becomes painful, red, inflamed, or looks infected.    If you received a biopsy/polypectomy/sphincterotomy the following instructions apply below:    __ Do not use Aspirin containing products, non-steroidal medications or anti-coagulants for one week following your procedure. (Examples of these types of medications are: Advil, Arthrotec, Aleve, Coumadin, Ecotrin, Heparin, Ibuprofen,  Indocin, Motrin, Naprosyn, Nuprin, Plavix, Vioxx, and Voltarin, or their generic forms.  This list is not all-inclusive.  Check with your physician or pharmacist before resuming medications.)   __ Eat a soft diet today.  Avoid foods that are poorly digested for the next 24 hours.  These foods would include: nuts, beans, lettuce, red meats, and fried foods. Start with liquids and advance your diet as tolerated, gradually work up to eating solids.   __ Do not have a Barium Study or Enema for one week.    Your physician recommends the additional following instructions:    -You have a contact number available for emergencies. The signs and symptoms of potential delayed complications were discussed with you. You may return to normal activities tomorrow.  -Resume your previous diet.  -Continue your present medications.   -We are waiting for your pathology results.  -Your physician has recommended a repeat colonoscopy (date to be determined after pending pathology results are reviewed) for surveillance based on pathology results.  -The findings and recommendations have been discussed with you.  -The findings and recommendations were discussed with your family.  - Please see Medication Reconciliation Form for new medication/medications prescribed.       If you experience any problems or have any questions following discharge from the GI Lab, please call:        Nurse Signature                                                                        Date___________________                                                                            Patient/Responsible Party Signature                                        Date___________________

## 2024-03-27 NOTE — ANESTHESIA POSTPROCEDURE EVALUATION
Patient: Austyn Alfaro    Procedure Summary       Date: 03/27/24 Room / Location: [facility] OR    Anesthesia Start: 0941 Anesthesia Stop: 1015    Procedure: COLONOSCOPY Diagnosis: Colon cancer screening    Scheduled Providers: Tobin Walters MD Responsible Provider: NITIN Ugalde    Anesthesia Type: MAC ASA Status: 2            Anesthesia Type: MAC    Vitals Value Taken Time   /65 03/27/24 1030   Temp 36.5 °C (97.7 °F) 03/27/24 1012   Pulse 72 03/27/24 1030   Resp 15 03/27/24 1030   SpO2 97 % 03/27/24 1045       Anesthesia Post Evaluation    Patient location during evaluation: PACU  Patient participation: complete - patient participated  Level of consciousness: awake  Pain management: adequate  Multimodal analgesia pain management approach  Airway patency: patent  Two or more strategies used to mitigate risk of obstructive sleep apnea  Cardiovascular status: acceptable  Respiratory status: acceptable  Hydration status: acceptable  Postoperative Nausea and Vomiting: none        No notable events documented.

## 2024-06-03 ENCOUNTER — APPOINTMENT (OUTPATIENT)
Dept: ORTHOPEDIC SURGERY | Facility: CLINIC | Age: 69
End: 2024-06-03
Payer: MEDICARE

## 2024-09-03 ENCOUNTER — TELEPHONE (OUTPATIENT)
Dept: PRIMARY CARE | Facility: CLINIC | Age: 69
End: 2024-09-03
Payer: MEDICARE

## 2024-09-03 DIAGNOSIS — E78.00 PURE HYPERCHOLESTEROLEMIA: ICD-10-CM

## 2024-09-03 DIAGNOSIS — I25.84 CORONARY ARTERY CALCIFICATION: ICD-10-CM

## 2024-09-03 DIAGNOSIS — M19.90 OSTEOARTHRITIS, UNSPECIFIED OSTEOARTHRITIS TYPE, UNSPECIFIED SITE: ICD-10-CM

## 2024-09-03 DIAGNOSIS — R35.1 NOCTURIA: ICD-10-CM

## 2024-09-03 DIAGNOSIS — E16.1 OTHER HYPOGLYCEMIA: ICD-10-CM

## 2024-09-03 DIAGNOSIS — E55.9 VITAMIN D DEFICIENCY: ICD-10-CM

## 2024-09-03 DIAGNOSIS — I25.10 CORONARY ARTERY CALCIFICATION: ICD-10-CM

## 2024-09-03 DIAGNOSIS — Z00.00 ROUTINE GENERAL MEDICAL EXAMINATION AT HEALTH CARE FACILITY: ICD-10-CM

## 2024-09-03 DIAGNOSIS — N52.9 ERECTILE DYSFUNCTION, UNSPECIFIED ERECTILE DYSFUNCTION TYPE: ICD-10-CM

## 2024-09-19 ENCOUNTER — LAB (OUTPATIENT)
Dept: LAB | Facility: LAB | Age: 69
End: 2024-09-19
Payer: MEDICARE

## 2024-09-19 DIAGNOSIS — E16.1 OTHER HYPOGLYCEMIA: ICD-10-CM

## 2024-09-19 DIAGNOSIS — N52.9 ERECTILE DYSFUNCTION, UNSPECIFIED ERECTILE DYSFUNCTION TYPE: ICD-10-CM

## 2024-09-19 DIAGNOSIS — I25.10 CORONARY ARTERY CALCIFICATION: ICD-10-CM

## 2024-09-19 DIAGNOSIS — M19.90 OSTEOARTHRITIS, UNSPECIFIED OSTEOARTHRITIS TYPE, UNSPECIFIED SITE: ICD-10-CM

## 2024-09-19 DIAGNOSIS — Z00.00 ROUTINE GENERAL MEDICAL EXAMINATION AT HEALTH CARE FACILITY: ICD-10-CM

## 2024-09-19 DIAGNOSIS — E78.00 PURE HYPERCHOLESTEROLEMIA: ICD-10-CM

## 2024-09-19 DIAGNOSIS — I25.84 CORONARY ARTERY CALCIFICATION: ICD-10-CM

## 2024-09-19 DIAGNOSIS — R35.1 NOCTURIA: ICD-10-CM

## 2024-09-19 DIAGNOSIS — E55.9 VITAMIN D DEFICIENCY: ICD-10-CM

## 2024-09-19 LAB
25(OH)D3 SERPL-MCNC: 31 NG/ML (ref 30–100)
ALBUMIN SERPL BCP-MCNC: 4.1 G/DL (ref 3.4–5)
ALP SERPL-CCNC: 84 U/L (ref 33–136)
ALT SERPL W P-5'-P-CCNC: 20 U/L (ref 10–52)
ANION GAP SERPL CALC-SCNC: 16 MMOL/L (ref 10–20)
APPEARANCE UR: CLEAR
AST SERPL W P-5'-P-CCNC: 19 U/L (ref 9–39)
BASOPHILS # BLD AUTO: 0.07 X10*3/UL (ref 0–0.1)
BASOPHILS NFR BLD AUTO: 1.1 %
BILIRUB SERPL-MCNC: 0.7 MG/DL (ref 0–1.2)
BILIRUB UR STRIP.AUTO-MCNC: NEGATIVE MG/DL
BUN SERPL-MCNC: 19 MG/DL (ref 6–23)
CALCIUM SERPL-MCNC: 9.1 MG/DL (ref 8.6–10.3)
CHLORIDE SERPL-SCNC: 103 MMOL/L (ref 98–107)
CHOLEST SERPL-MCNC: 223 MG/DL (ref 0–199)
CHOLESTEROL/HDL RATIO: 4.7
CO2 SERPL-SCNC: 28 MMOL/L (ref 21–32)
COLOR UR: NORMAL
CREAT SERPL-MCNC: 0.87 MG/DL (ref 0.5–1.3)
EGFRCR SERPLBLD CKD-EPI 2021: >90 ML/MIN/1.73M*2
EOSINOPHIL # BLD AUTO: 0.79 X10*3/UL (ref 0–0.7)
EOSINOPHIL NFR BLD AUTO: 12.1 %
ERYTHROCYTE [DISTWIDTH] IN BLOOD BY AUTOMATED COUNT: 12.8 % (ref 11.5–14.5)
EST. AVERAGE GLUCOSE BLD GHB EST-MCNC: 117 MG/DL
GLUCOSE SERPL-MCNC: 83 MG/DL (ref 74–99)
GLUCOSE UR STRIP.AUTO-MCNC: NORMAL MG/DL
HBA1C MFR BLD: 5.7 %
HCT VFR BLD AUTO: 46.5 % (ref 41–52)
HDLC SERPL-MCNC: 47 MG/DL
HGB BLD-MCNC: 15.2 G/DL (ref 13.5–17.5)
IMM GRANULOCYTES # BLD AUTO: 0.02 X10*3/UL (ref 0–0.7)
IMM GRANULOCYTES NFR BLD AUTO: 0.3 % (ref 0–0.9)
KETONES UR STRIP.AUTO-MCNC: NEGATIVE MG/DL
LDLC SERPL CALC-MCNC: 159 MG/DL
LEUKOCYTE ESTERASE UR QL STRIP.AUTO: NEGATIVE
LYMPHOCYTES # BLD AUTO: 0.95 X10*3/UL (ref 1.2–4.8)
LYMPHOCYTES NFR BLD AUTO: 14.5 %
MCH RBC QN AUTO: 30 PG (ref 26–34)
MCHC RBC AUTO-ENTMCNC: 32.7 G/DL (ref 32–36)
MCV RBC AUTO: 92 FL (ref 80–100)
MONOCYTES # BLD AUTO: 0.78 X10*3/UL (ref 0.1–1)
MONOCYTES NFR BLD AUTO: 11.9 %
NEUTROPHILS # BLD AUTO: 3.92 X10*3/UL (ref 1.2–7.7)
NEUTROPHILS NFR BLD AUTO: 60.1 %
NITRITE UR QL STRIP.AUTO: NEGATIVE
NON HDL CHOLESTEROL: 176 MG/DL (ref 0–149)
NRBC BLD-RTO: 0 /100 WBCS (ref 0–0)
PH UR STRIP.AUTO: 6 [PH]
PLATELET # BLD AUTO: 300 X10*3/UL (ref 150–450)
POTASSIUM SERPL-SCNC: 4.6 MMOL/L (ref 3.5–5.3)
PROT SERPL-MCNC: 6.6 G/DL (ref 6.4–8.2)
PROT UR STRIP.AUTO-MCNC: NEGATIVE MG/DL
PSA SERPL-MCNC: 3.47 NG/ML
RBC # BLD AUTO: 5.07 X10*6/UL (ref 4.5–5.9)
RBC # UR STRIP.AUTO: NEGATIVE /UL
SODIUM SERPL-SCNC: 142 MMOL/L (ref 136–145)
SP GR UR STRIP.AUTO: 1.02
TRIGL SERPL-MCNC: 84 MG/DL (ref 0–149)
TSH SERPL-ACNC: 1.07 MIU/L (ref 0.44–3.98)
UROBILINOGEN UR STRIP.AUTO-MCNC: NORMAL MG/DL
VLDL: 17 MG/DL (ref 0–40)
WBC # BLD AUTO: 6.5 X10*3/UL (ref 4.4–11.3)

## 2024-09-19 PROCEDURE — 81003 URINALYSIS AUTO W/O SCOPE: CPT

## 2024-09-19 PROCEDURE — 80053 COMPREHEN METABOLIC PANEL: CPT

## 2024-09-19 PROCEDURE — 80061 LIPID PANEL: CPT

## 2024-09-19 PROCEDURE — 85025 COMPLETE CBC W/AUTO DIFF WBC: CPT

## 2024-09-19 PROCEDURE — 84153 ASSAY OF PSA TOTAL: CPT

## 2024-09-19 PROCEDURE — 83036 HEMOGLOBIN GLYCOSYLATED A1C: CPT

## 2024-09-19 PROCEDURE — 84443 ASSAY THYROID STIM HORMONE: CPT

## 2024-09-19 PROCEDURE — 36415 COLL VENOUS BLD VENIPUNCTURE: CPT

## 2024-09-19 PROCEDURE — 82306 VITAMIN D 25 HYDROXY: CPT

## 2024-10-25 PROBLEM — M25.569 ACUTE KNEE PAIN: Status: RESOLVED | Noted: 2024-10-25 | Resolved: 2024-10-25

## 2024-10-25 PROBLEM — M54.2 CERVICAL PAIN (NECK): Status: ACTIVE | Noted: 2024-10-25

## 2024-10-25 PROBLEM — M54.50 ACUTE LUMBAR BACK PAIN: Status: RESOLVED | Noted: 2024-10-25 | Resolved: 2024-10-25

## 2024-10-25 PROBLEM — I48.0 PAROXYSMAL ATRIAL FIBRILLATION (MULTI): Status: ACTIVE | Noted: 2024-10-25

## 2024-10-25 PROBLEM — M17.12 PRIMARY OSTEOARTHRITIS OF LEFT KNEE: Status: ACTIVE | Noted: 2024-10-25

## 2024-10-25 PROBLEM — R05.9 COUGH: Status: ACTIVE | Noted: 2024-10-25

## 2024-10-28 ENCOUNTER — APPOINTMENT (OUTPATIENT)
Dept: PRIMARY CARE | Facility: CLINIC | Age: 69
End: 2024-10-28
Payer: MEDICARE

## 2024-10-28 VITALS
DIASTOLIC BLOOD PRESSURE: 80 MMHG | TEMPERATURE: 97.3 F | WEIGHT: 205.8 LBS | HEART RATE: 78 BPM | OXYGEN SATURATION: 98 % | SYSTOLIC BLOOD PRESSURE: 122 MMHG | BODY MASS INDEX: 26.42 KG/M2

## 2024-10-28 DIAGNOSIS — I48.0 PAROXYSMAL ATRIAL FIBRILLATION (MULTI): ICD-10-CM

## 2024-10-28 DIAGNOSIS — J32.9 OTHER SINUSITIS, UNSPECIFIED CHRONICITY: ICD-10-CM

## 2024-10-28 DIAGNOSIS — E78.00 PURE HYPERCHOLESTEROLEMIA: ICD-10-CM

## 2024-10-28 DIAGNOSIS — Z00.00 ROUTINE GENERAL MEDICAL EXAMINATION AT HEALTH CARE FACILITY: Primary | ICD-10-CM

## 2024-10-28 PROCEDURE — 1160F RVW MEDS BY RX/DR IN RCRD: CPT | Performed by: INTERNAL MEDICINE

## 2024-10-28 PROCEDURE — 1159F MED LIST DOCD IN RCRD: CPT | Performed by: INTERNAL MEDICINE

## 2024-10-28 PROCEDURE — G0439 PPPS, SUBSEQ VISIT: HCPCS | Performed by: INTERNAL MEDICINE

## 2024-10-28 PROCEDURE — 1170F FXNL STATUS ASSESSED: CPT | Performed by: INTERNAL MEDICINE

## 2024-10-28 PROCEDURE — 1126F AMNT PAIN NOTED NONE PRSNT: CPT | Performed by: INTERNAL MEDICINE

## 2024-10-28 PROCEDURE — 1036F TOBACCO NON-USER: CPT | Performed by: INTERNAL MEDICINE

## 2024-10-28 PROCEDURE — 99213 OFFICE O/P EST LOW 20 MIN: CPT | Performed by: INTERNAL MEDICINE

## 2024-10-28 PROCEDURE — 1123F ACP DISCUSS/DSCN MKR DOCD: CPT | Performed by: INTERNAL MEDICINE

## 2024-10-28 PROCEDURE — 1158F ADVNC CARE PLAN TLK DOCD: CPT | Performed by: INTERNAL MEDICINE

## 2024-10-28 RX ORDER — METHYLPREDNISOLONE 4 MG/1
TABLET ORAL
Qty: 21 TABLET | Refills: 0 | Status: SHIPPED | OUTPATIENT
Start: 2024-10-28 | End: 2024-11-04

## 2024-10-28 ASSESSMENT — ENCOUNTER SYMPTOMS
DYSPHORIC MOOD: 0
DIARRHEA: 0
ABDOMINAL DISTENTION: 0
FLANK PAIN: 0
SORE THROAT: 0
HEADACHES: 0
HEMATURIA: 0
EYE REDNESS: 0
POLYPHAGIA: 0
SLEEP DISTURBANCE: 0
VOICE CHANGE: 0
BLOOD IN STOOL: 0
WEAKNESS: 0
PALPITATIONS: 0
MYALGIAS: 0
TREMORS: 0
STRIDOR: 0
NAUSEA: 0
WOUND: 0
HALLUCINATIONS: 0
RHINORRHEA: 0
FACIAL ASYMMETRY: 0
PHOTOPHOBIA: 0
NUMBNESS: 0
CONSTIPATION: 0
SINUS PAIN: 0
SHORTNESS OF BREATH: 0
DIZZINESS: 0
ARTHRALGIAS: 1
COUGH: 0
CHEST TIGHTNESS: 0
NERVOUS/ANXIOUS: 0
WHEEZING: 0
DYSURIA: 0
BACK PAIN: 0
TROUBLE SWALLOWING: 0
NECK STIFFNESS: 0
SINUS PRESSURE: 1
POLYDIPSIA: 0
APPETITE CHANGE: 0
BRUISES/BLEEDS EASILY: 0
FATIGUE: 0
DIFFICULTY URINATING: 0
COLOR CHANGE: 0
CONFUSION: 0
EYE DISCHARGE: 0
SPEECH DIFFICULTY: 0
SEIZURES: 0
FREQUENCY: 0
ACTIVITY CHANGE: 0
FEVER: 0
ABDOMINAL PAIN: 0
VOMITING: 0

## 2024-10-28 ASSESSMENT — ACTIVITIES OF DAILY LIVING (ADL)
EATING: INDEPENDENT
DRESSING: INDEPENDENT
GROCERY_SHOPPING: INDEPENDENT
BATHING: INDEPENDENT
DOING_HOUSEWORK: INDEPENDENT
PATIENT'S MEMORY ADEQUATE TO SAFELY COMPLETE DAILY ACTIVITIES?: YES
STIL DRIVING: YES
TAKING_MEDICATION: INDEPENDENT
NEEDS ASSISTANCE WITH FOOD: INDEPENDENT
MANAGING_FINANCES: INDEPENDENT
ADEQUATE_TO_COMPLETE_ADL: YES
JUDGMENT_ADEQUATE_SAFELY_COMPLETE_DAILY_ACTIVITIES: YES
FEEDING YOURSELF: INDEPENDENT
USING TELEPHONE: INDEPENDENT
USING TRANSPORTATION: INDEPENDENT
PREPARING MEALS: INDEPENDENT
GROOMING: INDEPENDENT
TOILETING: INDEPENDENT

## 2024-10-28 ASSESSMENT — PAIN SCALES - GENERAL: PAINLEVEL_OUTOF10: 0-NO PAIN

## 2024-10-28 ASSESSMENT — ANXIETY QUESTIONNAIRES
GAD7 TOTAL SCORE: 0
1. FEELING NERVOUS, ANXIOUS, OR ON EDGE: NOT AT ALL
4. TROUBLE RELAXING: NOT AT ALL
2. NOT BEING ABLE TO STOP OR CONTROL WORRYING: NOT AT ALL
7. FEELING AFRAID AS IF SOMETHING AWFUL MIGHT HAPPEN: NOT AT ALL
3. WORRYING TOO MUCH ABOUT DIFFERENT THINGS: NOT AT ALL
5. BEING SO RESTLESS THAT IT IS HARD TO SIT STILL: NOT AT ALL
6. BECOMING EASILY ANNOYED OR IRRITABLE: NOT AT ALL

## 2024-12-27 ENCOUNTER — OFFICE VISIT (OUTPATIENT)
Dept: ORTHOPEDIC SURGERY | Facility: CLINIC | Age: 69
End: 2024-12-27
Payer: MEDICARE

## 2024-12-27 VITALS — BODY MASS INDEX: 26.31 KG/M2 | WEIGHT: 205 LBS | HEIGHT: 74 IN

## 2024-12-27 DIAGNOSIS — M41.50 SCOLIOSIS DUE TO DEGENERATIVE DISEASE OF SPINE IN ADULT PATIENT: ICD-10-CM

## 2024-12-27 DIAGNOSIS — M48.062 SPINAL STENOSIS OF LUMBAR REGION WITH NEUROGENIC CLAUDICATION: Primary | ICD-10-CM

## 2024-12-27 PROCEDURE — 99215 OFFICE O/P EST HI 40 MIN: CPT | Performed by: ORTHOPAEDIC SURGERY

## 2024-12-27 RX ORDER — PREDNISONE 20 MG/1
TABLET ORAL
Qty: 30 TABLET | Refills: 0 | Status: SHIPPED | OUTPATIENT
Start: 2024-12-27 | End: 2025-01-10

## 2024-12-27 ASSESSMENT — PAIN SCALES - GENERAL: PAINLEVEL_OUTOF10: 8

## 2024-12-27 ASSESSMENT — PAIN - FUNCTIONAL ASSESSMENT: PAIN_FUNCTIONAL_ASSESSMENT: 0-10

## 2024-12-27 ASSESSMENT — ENCOUNTER SYMPTOMS
DEPRESSION: 0
OCCASIONAL FEELINGS OF UNSTEADINESS: 1
LOSS OF SENSATION IN FEET: 0

## 2024-12-27 NOTE — PROGRESS NOTES
Chief Complaint: Bilateral leg pain and tingling    All previous Progress Notes and imaging results related to this patients chief complaint have been reviewed in preparation for this examination.    HPI: Austyn Alfaro is a 69 y.o. year old male patient with recent history of mild tingling which occurred in both legs simultaneously and similarly beginning in March 2023.  Initially it was only at night lying down.  About 6 months later it began to bother him during the daytime as well standing and walking.  He was evaluated at the [facility] [place] and given a low-dose of gabapentin.  He went to physical therapy from October through December 2023 he managed all normal activities subsequently which included golf tennis and paddle.  He did not restrict his activities in any way.  He was very busy throughout the summer 2024 with no limitations except the tingling was notable.  In October 2024 he took a cruise and developed intense pain in both legs including pain in the groin bilaterally which lasted for 2 weeks.  He subsequently was reevaluated and his gabapentin was increased and he was placed on a Medrol Dosepak.  He is now taking 800 mg gabapentin 3 times daily.  On Thanksgiving morning he had the much worse exacerbation of the pain and tingling and dysesthesias of his feet and toes.  He was barely able to walk.  Several days he had intense left buttock pain extending down the leg as sciatica.  He was unable to sleep at all because of pain in his left leg.  At this stage she can barely stand for 10 minutes.  He has better leaning forward.  He tries to extend his back as therapy had instructed exacerbates his pain.  In addition to the leg pain he also has slight deformity of his left ankle and foot from peroneal tendon which not been repaired.  He has no constitutional symptoms.  He has no difficulties with bladder control or bowels.  There is no dysesthesias in the perineum.    Prior spine surgeries:  None    Physical Therapy: Yes   Other conservative care: Pain management injections  Activity modification: Eliminated sports  Employment: Retired   Exercise: None currently  Review of Systems    All other systems have been reviewed and are negative for complaint. All pertinent positive and negative as listed in history of present illness.    Past Medical History:   Diagnosis Date    Acute knee pain 10/25/2024    Acute lumbar back pain 10/25/2024    Arthritis Sep 2013    Atrial fibrillation (Multi)     Cancer (Multi) 2023    Colon cancer screening 03/27/2024    Coronary artery calcification     Cough 07/06/2023    Herpes zoster subepithelial infiltrates 05/31/2023    History of right hip replacement 09/24/2021    Left foot pain     Paroxysmal atrial fibrillation (Multi) 07/06/2023    Pitting of nails 09/24/2021    Superficial keratitis of left eye 05/26/2023        Past Surgical History:   Procedure Laterality Date    ANKLE SURGERY Left 1986    CARDIAC CATHETERIZATION  3/2020    CARDIAC ELECTROPHYSIOLOGY STUDY AND ABLATION      JOINT REPLACEMENT  9/2013    SHOULDER SURGERY Right 1980    TOTAL HIP ARTHROPLASTY Right         No Known Allergies     Current Outpatient Medications on File Prior to Visit   Medication Sig Dispense Refill    GABAPENTIN ORAL Take 1,200 mg by mouth once daily.      tamsulosin (Flomax) 0.4 mg 24 hr capsule Take 1 capsule (0.4 mg) by mouth once daily in the evening.       No current facility-administered medications on file prior to visit.        Tobacco: No  Anticoagulation : No    PE:   General: Patient appears  well-developed in no acute distress, Alert and Oriented x3  Psych: Pleasant mood and affect  HEENT: Extraocular muscles intact, pupils equal and round. Sclerae anicteric   Cardio: extremities warm and well perfused  Resp: unlabored symmetric breathing, no wheezing, SOB, cough.  Skin: no open wounds or rash  Musculoskeletal/Neuro Exam: Normal gait.  Heel walk: Normal, toe walk:  Normal, single-leg stance: Normal, knee bend: Normal.   No tenderness to palpation along the spinous processes sacrum or iliac crest.  Negative straight leg raise bilaterally.  No groin pain with ROM of the hip joints with IR and ER.  Trochanteric tenderness: No.  Neutral spinal balance. Lumbar extension: 10 degrees. Toe-touch to distal tib. Lumbar flexion painful: No. Shoulder balance: Neutral. Rib hump: No.    Lower extremity  Motor: Right leg with 5 out of 5 motor strength with hip flexion, knee extension, ankle dorsiflexion plantarflexion and EHL against resistance.  Left leg with 5 out of 5 motor strength with hip flexion, knee extension, ankle dorsiflexion plantarflexion EHL against resistance  Sensation to light touch intact along L2 to S1 distribution bilaterally  2+ right patella reflex, absent left knee jerk no ankle jerks bilaterally.  No fasciculations, atrophy, or edema.  Normal motor tone.  Negative Babinski no clonus   Skin warm, intact.  Normal capillary refill.        Imaging reviewed:  MRI of the lumbar spine was performed on December 18.  It shows a right degenerative lumbar scoliosis which is mild to moderate.  There is severe stenosis at L4-5 and predominantly left-sided stenosis at L3-4.  There is a retrolisthesis at L5-S1 with severe L5 foraminal stenosis bilaterally.  There is retrolisthesis at T12-L1 with no residual disc.            Assessment   1. Spinal stenosis of lumbar region with neurogenic claudication  Referral to Physical Therapy    predniSONE (Deltasone) 20 mg tablet      2. Scoliosis due to degenerative disease of spine in adult patient            A/P: Austyn Alfaro is a 69 y.o. year old male patient with worsening neurogenic claudication with pain and burning and tingling in both legs left worse than right.    Treatment or Intervention:  Patient is not eager to consider surgical intervention yet.  I have referred him to aquatic physical therapy which she will schedule at  [facility].  I have given him a 15-day course of prednisone.  He has already made an appointment for pain management and I have encouraged him to try to move that up.  Ultimately, surgery is likely although it is not urgent nor necessarily soon.        Follow-up to be determined

## 2024-12-31 ENCOUNTER — APPOINTMENT (OUTPATIENT)
Dept: PAIN MEDICINE | Facility: CLINIC | Age: 69
End: 2024-12-31
Payer: MEDICARE

## 2025-01-13 ENCOUNTER — TELEPHONE (OUTPATIENT)
Dept: ORTHOPEDIC SURGERY | Facility: CLINIC | Age: 70
End: 2025-01-13
Payer: MEDICARE

## 2025-01-13 NOTE — TELEPHONE ENCOUNTER
Copied from CRM #[id number]. Topic: Information Request - Doctor, Hospital, or Provider  >> Jan 13, 2025 11:11 AM Gris JOHNSON wrote:  [phone] [facility] [employer] faxed over notes for pt brace and form to sign from provider

## 2025-01-16 ENCOUNTER — APPOINTMENT (OUTPATIENT)
Dept: PAIN MEDICINE | Facility: CLINIC | Age: 70
End: 2025-01-16
Payer: MEDICARE

## 2025-01-26 NOTE — PROGRESS NOTES
Physical Therapy Evaluation     Patient Name: Austyn Alfaro  MRN: [id number]  Today's Date: 1/27/2025  Time Calculation  Start Time: 1130  Stop Time: 1215  Time Calculation (min): 45 min  PT Evaluation Time Entry  PT Evaluation (Low) Time Entry: 40     PT Therapeutic Procedures Time Entry  Therapeutic Exercise Time Entry: 5    Insurance Considerations: Payor: MEDICARE / Plan: MEDICARE PART A AND B / Product Type: *No Product type* /  [place]IC[place] A/B, [place],  [place], NO AUTH ( 0 USED PT/ST)     Problem List Items Addressed This Visit    None  Visit Diagnoses         Codes    Spinal stenosis of lumbar region with neurogenic claudication     M48.062    Relevant Orders    Follow Up In Physical Therapy            Subjective  /General:  General  Reason for Referral: Spinal stenosis with neurogenic claudication  Referred By: Tomasz Chowdhury  Patient reported hx of current condition: The pt presents with chronic tingling in the LEs that started in march of 2023. At that time, it was primarily impacting his sleep and he was prescribed gabapentin and his symptoms remained steady until Thanksgiving of 2024. Throughout that time he was very active with golfing, regular tennis, etc. He notes that at Thanksgiving, he went out of town, played golf 3 times, pickleball a few times, and then woke up on Thanksgiving morning with limited ability to ambulate d/t constant nerve symptoms in the LEs. He notes that he had his gabapentin increased to the max dose. Around the first of the year, he sought out a second opinion and got a high dose steroid, which helped with the tingling, but had a different pain that he describes as sciatic cramping. Since that time, he has completed the steroid and the symptoms have returned. He anticipates a lumbar steroid injection tomorrow.     Aggravating factors: sleeping on his side, walking, stairs, putting on socks/shoes  Relieving factors: sleeping on his back, sleeping on his stomach, ice, sitting  "(provides most relief)    Precautions/ Red Flags:  Precautions  Precautions Comment: Hx of skin CA    Red flags   Hx of CA Yes - Skin CA that was removed    Pacemaker/ Electronic Implant No   Saddle Anesthesia No   Bowel/Bladder Changes No   Sudden Weakness Yes - some instances of inability to lift foot off the floor during gait   Recent Falls (within last 6mo) No     Relevant PMH:  R hip replacement 2014  Cardiac cath ablation 2020  L peroneal tendon tear about a yr ago    Pain:  Pain Assessment  Pain Assessment: 0-10  0-10 (Numeric) Pain Score: 3 (Worst pain in past 2 weeks: 8/10 (it reduces quickly))  Pain Type: Neuropathic pain, Chronic pain  Pain Location: Back  Pain Radiating Towards: L>R sided LE - typically laterally down to the toes; does radiate into the groin as well. Most pain is groin pain and along the lateral aspect of the knee  Pain Descriptors: Burning, Pins and needles (\"electric current going down the LEs\")  Home Living:      Lives with: Spouse  Home type: House  Stairs: Yes  Occupation: retired  Hobbies/activities: golfing, tennis, pickle ball   Prior Function Per Pt/Caregiver Report:  Prior Function Per Pt/Caregiver Report  Prior Function Comments: Very active prior to thanksgiving 2024. Participated in numerous sports including golf, tennis, and pickle ball on a regular basis    Objective   Posture:  Posture Comment: Pt is seated with a flexed and guarded trunk posture. The pt occassionally shifts in his chair. In standing, the pt stands slightly flexed with L hip appearing elevated compared to R  Flexibility:  Flexibility Comment: R hamstring more limited than L  Gait:  Gait Comment: The pt ambulates into the clinic indep withou AD. He ambulates with flexed trunk posture and significantly antalgic gait pattern on L>R. Trendelenburg present.  Bed Mobility:  Bed Mobility Comment: indep, but increased time required and facial grimaces present  Transfers:  Transfers Comment: indep, but increased " "time required  Other:     Lumbar AROM Range   Flexion No limitation - slow to move - \"felt like a good stretch\"   Extension No limitation \"no change\"   R sidebend 50% limitation \"pulling on L\"   L sidebend 50% limitation      Hip AROM L R   Flexion ~110 deg ~110 deg   External Rotation ~35 deg ~35 deg   Internal Rotation ~20 deg ~20 deg   Comments: L LE movements are more stiff and guarded compared to R     Hip MMT L R   Hip Flexion 4+/5* \"groin pain\" 5/5   Hip Extension 4/5 4+/5   Hip Abduction 4-/5 4+/5   External Rotation 4+/5 5/5   Internal Rotation 4+/5 5/5      Knee MMT L R   Knee Flexion 5/5 5/5   Knee Extension 5/5 5/5      Ankle MMT L R   Dorsiflexion 5/5 5/5   Plantarflexion 5/5 5/5      Special Tests: +/-   SLR -   Scour -      Outcome Measures:  Other Measures  Oswestry Disablity Index (LAURENT): 31     Assessment:  PT Assessment Results: Decreased strength, Decreased range of motion, Decreased mobility, Pain  Rehab Prognosis: Fair    Pt is a 69 y.o. male who presents with signs and symptoms consistent with lumbar spine degeneration, including stenosis, causing radicular symptoms. The current impairments have led to functional limitations that include: standing, walking, sleeping, and stair climbing. Pt would benefit from skilled physical therapy intervention to improve impairments and facilitate return to prior function.    Complexity of Evaluation: Low    Based on the history including personal factors and/or comorbidities, examination of body systems including body structures and function, activity limitations, and/or participation restrictions, as well as clinical presentation, patient meets criteria for a Low complexity evaluation.    Plan:  Treatment/Interventions: Aquatic therapy, Dry needling, Cryotherapy, Education/ Instruction, Electrical stimulation, Gait training, Manual therapy, Neuromuscular re-education, Therapeutic activities, Therapeutic exercises  PT Plan: Skilled PT  PT Frequency: 1 time " "per week  Duration: 15 total visits (eval + 9 pool and 4-5 land)  Onset Date: 01/27/25  Certification Period Start Date: 01/27/25  Certification Period End Date: 04/27/25  Number of Treatments Authorized: [place]/MC cap  Rehab Potential: Fair  Plan of Care Agreement: Patient    Plan for next visit: Initiate aquatic exercise - deep water emphasis     OP EDUCATION:  Outpatient Education  Individual(s) Educated: Patient, Spouse  Education Provided: Anatomy, Home Exercise Program, Physiology, POC  Risk and Benefits Discussed with Patient/Caregiver/Other: yes  Patient/Caregiver Demonstrated Understanding: yes  Plan of Care Discussed and Agreed Upon: yes  Patient Response to Education: Patient/Caregiver Verbalized Understanding of Information, Patient/Caregiver Performed Return Demonstration of Exercises/Activities, Patient/Caregiver Asked Appropriate Questions    Today's Treatment:  Therapeutic Exercise  HEP to be completed daily, exercises include:  HL LTRs     Goals:  Active       PT Problem       PT STG       Start:  01/27/25    Expected End:  03/13/25         - Pt will complete the HEP with <3 verbal cues for correction,   - Pt will demonstrate 2pt improvement on the NPRS, allowing for improved tolerance of functional activities.,   - Pt will demonstrate min losses in all lumbar AROM, without onset of pain, allowing for improved ability to perform functional activities.,   - Pt will demonstrate ability to perform 5 step ups to 8\" step leading with each LE and with 1 UE support in order to demonstrate improved ability to navigate stairs,   - Pt will demonstrate ability to ambulate 300' with least restrictive device and equal stance time bilat in order to begin demoing normalized gait pattern.,   - Pt will demonstrate at least 4+/5 MMT grading throughout LE musculature, allowing for appropriate muscle recruitment during daily activities. ,          PT LTG       Start:  01/27/25    Expected End:  04/27/25       - Pt will be " "independent in HEP & symptom management,   - Pt will demonstrate 4pt improvement on the NPRS, allowing for improved tolerance of functional activities. ,   - Pt will demonstrate full ROM in all lumbar AROM, without onset of pain, allowing for the ability to perform functional activities.,   - Pt will demonstrate ability to ambulate at least 350' with least restrictive/no device without breaks or increases in pain in order to demonstrate improved tolerance to prolonged ambulation.,   - Pt will obtain and maintain a neutral posture throughout PT session in order to allow for proper muscle length-tension relationships and recruitment for daily tasks,   - Pt will demonstrate ability to perform 10 sit to stands without UE use in order to demonstrate improved functional LE strength and improved independence with functional mobility,   - Pt will demonstrate ability to perform 10 step ups to 8\" step leading with each LE and with 1 UE support in order to demonstrate improved ability to navigate stairs,   - Pt will demonstrate improved OSWESTRY score by 13 points (MCID) in order to demonstrate improved functional mobility.     Patient stated goals: \"pain relief in both legs, restore function in walking, standing, climbing stairs\"                            "

## 2025-01-27 ENCOUNTER — EVALUATION (OUTPATIENT)
Dept: PHYSICAL THERAPY | Facility: CLINIC | Age: 70
End: 2025-01-27
Payer: MEDICARE

## 2025-01-27 DIAGNOSIS — M48.062 SPINAL STENOSIS OF LUMBAR REGION WITH NEUROGENIC CLAUDICATION: ICD-10-CM

## 2025-01-27 PROCEDURE — 97161 PT EVAL LOW COMPLEX 20 MIN: CPT | Mod: GP

## 2025-01-27 ASSESSMENT — PAIN SCALES - GENERAL: PAINLEVEL_OUTOF10: 3

## 2025-01-27 ASSESSMENT — PATIENT HEALTH QUESTIONNAIRE - PHQ9
SUM OF ALL RESPONSES TO PHQ9 QUESTIONS 1 AND 2: 3
3. TROUBLE FALLING OR STAYING ASLEEP OR SLEEPING TOO MUCH: NEARLY EVERY DAY
7. TROUBLE CONCENTRATING ON THINGS, SUCH AS READING THE NEWSPAPER OR WATCHING TELEVISION: NOT AT ALL
8. MOVING OR SPEAKING SO SLOWLY THAT OTHER PEOPLE COULD HAVE NOTICED. OR THE OPPOSITE, BEING SO FIGETY OR RESTLESS THAT YOU HAVE BEEN MOVING AROUND A LOT MORE THAN USUAL: NEARLY EVERY DAY
1. LITTLE INTEREST OR PLEASURE IN DOING THINGS: NEARLY EVERY DAY
4. FEELING TIRED OR HAVING LITTLE ENERGY: NEARLY EVERY DAY
5. POOR APPETITE OR OVEREATING: NOT AT ALL
2. FEELING DOWN, DEPRESSED OR HOPELESS: NOT AT ALL
9. THOUGHTS THAT YOU WOULD BE BETTER OFF DEAD, OR OF HURTING YOURSELF: NOT AT ALL
6. FEELING BAD ABOUT YOURSELF - OR THAT YOU ARE A FAILURE OR HAVE LET YOURSELF OR YOUR FAMILY DOWN: SEVERAL DAYS
SUM OF ALL RESPONSES TO PHQ QUESTIONS 1-9: 13
10. IF YOU CHECKED OFF ANY PROBLEMS, HOW DIFFICULT HAVE THESE PROBLEMS MADE IT FOR YOU TO DO YOUR WORK, TAKE CARE OF THINGS AT HOME, OR GET ALONG WITH OTHER PEOPLE: VERY DIFFICULT

## 2025-01-27 ASSESSMENT — PAIN - FUNCTIONAL ASSESSMENT: PAIN_FUNCTIONAL_ASSESSMENT: 0-10

## 2025-01-27 ASSESSMENT — PAIN DESCRIPTION - DESCRIPTORS: DESCRIPTORS: BURNING;PINS AND NEEDLES

## 2025-02-05 ENCOUNTER — TREATMENT (OUTPATIENT)
Dept: PHYSICAL THERAPY | Facility: CLINIC | Age: 70
End: 2025-02-05
Payer: MEDICARE

## 2025-02-05 DIAGNOSIS — M48.062 SPINAL STENOSIS OF LUMBAR REGION WITH NEUROGENIC CLAUDICATION: ICD-10-CM

## 2025-02-05 PROCEDURE — 97113 AQUATIC THERAPY/EXERCISES: CPT | Mod: GP,CQ

## 2025-02-05 ASSESSMENT — PAIN SCALES - GENERAL: PAINLEVEL_OUTOF10: 4

## 2025-02-05 ASSESSMENT — PAIN - FUNCTIONAL ASSESSMENT: PAIN_FUNCTIONAL_ASSESSMENT: 0-10

## 2025-02-05 NOTE — PROGRESS NOTES
"    Physical Therapy Treatment    Patient Name: Austyn Alfaro  MRN: [id number]  Encounter date:  2/5/2025  Time Calculation  Start Time: 1300          Visit Number:  2 (including evaluation)  Planned total visits: 15  Visit Authorized/insurance coverage:  Payor: MEDICARE / Plan: MEDICARE PART A AND B / Product Type: *No Product type* /  [place]IC[place] A/[place], [place],  [place], [place] AUTH ( 0 USED PT/ST)   Progress Report due visit #10        Current Problem  Problem List Items Addressed This Visit    None  Visit Diagnoses         Codes    Spinal stenosis of lumbar region with neurogenic claudication     M48.062             Precautions  Precautions  Precautions Comment: Hx of skin CA      Pain  Pain Assessment: 0-10  0-10 (Numeric) Pain Score: 4  Pain Type: Neuropathic pain  Pain Location: Back    Subjective  General   Patient reports that his pain is generally more in the morning when he first gets up. He reports that he got injections 2 weeks ago and id not get any relief from them.          Objective  Significant antalgic gait with weak L hip flexion and flexed trunk posture.      Treatment:      HS stretch at stairs 3/20\" R/L    Trunk rotation with black noodle x20     Deep:  Decompression 2'  Bicycle 2'  Decompression 2'  Abd/Add 2'  Decompression 2'  XX ski 2'  Decompression 2'  DKTC 2'  Decompression 2'  Hip ER 2'  Decompression 2'    Stretch at pool wall     Repeat above    Forward walking in 4.5 ft to acclimate to shallow water.       Current HEP:  HL LTRs       Billed Treatment Times:  41      OP EDUCATION:   Discussed benefits of therapy being in the deep end with progression in the shallow end of pool when tolerable.     Assessment:   Patient tolerated the initiation of aquatics well with decreased pain at session end.  He notes some fatigue and anticipates increased discomfort later.  It was suggested that he rest this evening and use ice if needed.   Areas of improvements:  Decreased pain and tolerance to " "aquatics.  Limitations/deficits:  Weakness and Pain limits activity     Pain end of session:  2/10    Plan:     Continue with current POC/no changes    Assessment of current progress against goals:  Symptomatic relief with treatment    Goals:  Active       PT Problem       PT STG       Start:  01/27/25    Expected End:  03/13/25         - Pt will complete the HEP with <3 verbal cues for correction,   - Pt will demonstrate 2pt improvement on the NPRS, allowing for improved tolerance of functional activities.,   - Pt will demonstrate min losses in all lumbar AROM, without onset of pain, allowing for improved ability to perform functional activities.,   - Pt will demonstrate ability to perform 5 step ups to 8\" step leading with each LE and with 1 UE support in order to demonstrate improved ability to navigate stairs,   - Pt will demonstrate ability to ambulate 300' with least restrictive device and equal stance time bilat in order to begin demoing normalized gait pattern.,   - Pt will demonstrate at least 4+/5 MMT grading throughout LE musculature, allowing for appropriate muscle recruitment during daily activities. ,          PT LTG       Start:  01/27/25    Expected End:  04/27/25       - Pt will be independent in HEP & symptom management,   - Pt will demonstrate 4pt improvement on the NPRS, allowing for improved tolerance of functional activities. ,   - Pt will demonstrate full ROM in all lumbar AROM, without onset of pain, allowing for the ability to perform functional activities.,   - Pt will demonstrate ability to ambulate at least 350' with least restrictive/no device without breaks or increases in pain in order to demonstrate improved tolerance to prolonged ambulation.,   - Pt will obtain and maintain a neutral posture throughout PT session in order to allow for proper muscle length-tension relationships and recruitment for daily tasks,   - Pt will demonstrate ability to perform 10 sit to stands without UE use " "in order to demonstrate improved functional LE strength and improved independence with functional mobility,   - Pt will demonstrate ability to perform 10 step ups to 8\" step leading with each LE and with 1 UE support in order to demonstrate improved ability to navigate stairs,   - Pt will demonstrate improved OSWESTRY score by 13 points (MCID) in order to demonstrate improved functional mobility.     Patient stated goals: \"pain relief in both legs, restore function in walking, standing, climbing stairs\"                      "

## 2025-02-10 ENCOUNTER — HOSPITAL ENCOUNTER (OUTPATIENT)
Dept: VASCULAR MEDICINE | Facility: HOSPITAL | Age: 70
Discharge: HOME | End: 2025-02-10
Payer: MEDICARE

## 2025-02-10 DIAGNOSIS — I82.432 ACUTE DEEP VEIN THROMBOSIS (DVT) OF POPLITEAL VEIN OF LEFT LOWER EXTREMITY (MULTI): Primary | ICD-10-CM

## 2025-02-10 DIAGNOSIS — Z86.718 PERSONAL HISTORY OF VENOUS THROMBOSIS AND EMBOLISM: ICD-10-CM

## 2025-02-10 DIAGNOSIS — R60.0 LOCALIZED EDEMA: ICD-10-CM

## 2025-02-10 PROBLEM — O22.30 DVT (DEEP VEIN THROMBOSIS) IN PREGNANCY (HHS-HCC): Status: ACTIVE | Noted: 2025-02-10

## 2025-02-10 PROBLEM — O22.30 DVT (DEEP VEIN THROMBOSIS) IN PREGNANCY (HHS-HCC): Status: RESOLVED | Noted: 2025-02-10 | Resolved: 2025-02-10

## 2025-02-10 PROCEDURE — 93971 EXTREMITY STUDY: CPT

## 2025-02-10 PROCEDURE — 93971 EXTREMITY STUDY: CPT | Performed by: SURGERY

## 2025-02-14 ENCOUNTER — TREATMENT (OUTPATIENT)
Dept: PHYSICAL THERAPY | Facility: CLINIC | Age: 70
End: 2025-02-14
Payer: MEDICARE

## 2025-02-14 DIAGNOSIS — M48.062 SPINAL STENOSIS OF LUMBAR REGION WITH NEUROGENIC CLAUDICATION: ICD-10-CM

## 2025-02-14 PROCEDURE — 97113 AQUATIC THERAPY/EXERCISES: CPT | Mod: GP

## 2025-02-14 ASSESSMENT — PAIN SCALES - GENERAL: PAINLEVEL_OUTOF10: 2

## 2025-02-14 NOTE — PROGRESS NOTES
"    Physical Therapy Treatment    Patient Name: Austyn Alfaro  MRN: [id number]  Encounter date:  2/14/2025  Time Calculation  Start Time: 1215  Stop Time: 1255  Time Calculation (min): 40 min     PT Therapeutic Procedures Time Entry  Aquatic Therapy Time Entry: 40    Visit Number:  3 (including evaluation)  Planned total visits: 15  Visit Authorized/insurance coverage:  Payor: MEDICARE / Plan: MEDICARE PART A AND B / Product Type: *No Product type* /  [place], [place],  [place], [place] AUTH ( 0 USED PT/ST)   Progress Report due visit #10        Current Problem  Problem List Items Addressed This Visit    None  Visit Diagnoses         Codes    Spinal stenosis of lumbar region with neurogenic claudication     M48.062             Precautions  Precautions  Precautions Comment: Hx of skin CA      Pain  0-10 (Numeric) Pain Score: 2  Pain Location: Back    Subjective  General  Reason for Referral: Spinal stenosis with neurogenic claudication  Referred By: Tomasz Chowdhury  The pt returns stating that he had his 2nd injection on Tuesday, but is still waiting to see the positive results. He notes very mild reduction in symptoms. He felt good after his first pool session with little residual soreness. He reports having a recurrence of LE blood clots (had 1 a few years ago and recognized the symptoms), so has been put on blood thinners     Objective  Significant antalgic gait with weak L hip flexion and flexed trunk posture.      Aquatic Treatment:    4.5' depth: fwd, lat, and retro walking x3 laps      7' Deep with noodle under arms:  Decompression 2'  Bicycle 2'  Decompression 2'  Abd/Add 2'  Decompression 2'  XX ski 2'  Decompression 2'  DKTC 2'  Decompression 2'  Hip ER 2'  Decompression 2'    Forward walking in 4 ft to acclimate to shallow water x3 laps    Trunk rotations x20     HS stretch at stairs 2 x30\" R/L    Current HEP:  HL LTRs       OP EDUCATION:   Discussed benefits of therapy being in the deep end with progression " "in the shallow end of pool when tolerable.     Assessment:   The pt notes most relief with deep water interventions and expressed interest in 60 for 60 program in order to allow him to come into the pool more frequently and then anticipates gym membership upon completion of PT and 60 for 60 program. Form completed this date.   Areas of improvements:  Decreased pain and tolerance to aquatics.  Limitations/deficits:  Weakness and Pain limits activity     Pain end of session:  2/10    Plan:  OP PT Plan  Treatment/Interventions: Aquatic therapy, Dry needling, Cryotherapy, Education/ Instruction, Electrical stimulation, Gait training, Manual therapy, Neuromuscular re-education, Therapeutic activities, Therapeutic exercises  PT Plan: Skilled PT  PT Frequency: 1 time per week  Duration: 15 total visits (eval + 9 pool and 4-5 land)  Onset Date: 01/27/25  Certification Period Start Date: 01/27/25  Certification Period End Date: 04/27/25  Number of Treatments Authorized: [place]/MC cap  Rehab Potential: Fair  Plan of Care Agreement: Patient  Continue with current POC/no changes    Assessment of current progress against goals:  Symptomatic relief with treatment    Goals:  Active       PT Problem       PT STG       Start:  01/27/25    Expected End:  03/13/25         - Pt will complete the HEP with <3 verbal cues for correction,   - Pt will demonstrate 2pt improvement on the NPRS, allowing for improved tolerance of functional activities.,   - Pt will demonstrate min losses in all lumbar AROM, without onset of pain, allowing for improved ability to perform functional activities.,   - Pt will demonstrate ability to perform 5 step ups to 8\" step leading with each LE and with 1 UE support in order to demonstrate improved ability to navigate stairs,   - Pt will demonstrate ability to ambulate 300' with least restrictive device and equal stance time bilat in order to begin demoing normalized gait pattern.,   - Pt will demonstrate at least " "4+/5 MMT grading throughout LE musculature, allowing for appropriate muscle recruitment during daily activities. ,          PT LTG       Start:  01/27/25    Expected End:  04/27/25       - Pt will be independent in HEP & symptom management,   - Pt will demonstrate 4pt improvement on the NPRS, allowing for improved tolerance of functional activities. ,   - Pt will demonstrate full ROM in all lumbar AROM, without onset of pain, allowing for the ability to perform functional activities.,   - Pt will demonstrate ability to ambulate at least 350' with least restrictive/no device without breaks or increases in pain in order to demonstrate improved tolerance to prolonged ambulation.,   - Pt will obtain and maintain a neutral posture throughout PT session in order to allow for proper muscle length-tension relationships and recruitment for daily tasks,   - Pt will demonstrate ability to perform 10 sit to stands without UE use in order to demonstrate improved functional LE strength and improved independence with functional mobility,   - Pt will demonstrate ability to perform 10 step ups to 8\" step leading with each LE and with 1 UE support in order to demonstrate improved ability to navigate stairs,   - Pt will demonstrate improved OSWESTRY score by 13 points (MCID) in order to demonstrate improved functional mobility.     Patient stated goals: \"pain relief in both legs, restore function in walking, standing, climbing stairs\"                      "

## 2025-02-18 NOTE — PROGRESS NOTES
"    Physical Therapy Treatment    Patient Name: Austyn Alafro  MRN: [id number]  Encounter date:  2/19/2025  Time Calculation  Start Time: 0845  Stop Time: 0925  Time Calculation (min): 40 min     PT Therapeutic Procedures Time Entry  Aquatic Therapy Time Entry: 40    Visit Number:  4 (including evaluation)  Planned total visits: 15  Visit Authorized/insurance coverage:  Payor: MEDICARE / Plan: MEDICARE PART A AND B / Product Type: *No Product type* /  [place], [place],  [place], NO AUTH ( 0 USED PT/ST)   Progress Report due visit #10        Current Problem  Problem List Items Addressed This Visit    None  Visit Diagnoses         Codes    Spinal stenosis of lumbar region with neurogenic claudication     M48.062           Precautions  Precautions  Precautions Comment: Hx of skin CA    Pain  0-10 (Numeric) Pain Score: 3  Pain Location: Back -- \"having some sciatica pain\"    Subjective  General  Reason for Referral: Spinal stenosis with neurogenic claudication  Referred By: Tomasz Chowdhury  The pt returns stating that he felt great after last session and wishes he could stay in the pool all day.     Objective  Significant antalgic gait with weak L hip flexion and flexed trunk posture.    Aquatic Treatment:    4.5' depth: fwd, lat, and retro walking x3 laps      7' Deep with noodle under arms:  Decompression 2'  Bicycle 2'  Decompression 2'  Abd/Add 2'  Decompression 2'  XX ski 2'  Decompression 2'  DKTC 2'  Decompression 2'  Hip ER 2'  Decompression 2'    Forward walking in 4 ft to acclimate to shallow water x3 laps    Trunk rotations x20     HS stretch at stairs 2 x30\" R/L  Hip flexor stretch at stairs 2 x 30\" R/L    Current HEP:  HL LTRs       OP EDUCATION:       Assessment:   The pt continues to report most relief in the deep water, but also pushes himself in the shallow water   Areas of improvements:  Decreased pain and tolerance to aquatics.  Limitations/deficits:  Weakness and Pain limits activity     Pain end of " "session:  0/10    Plan:  OP PT Plan  Treatment/Interventions: Aquatic therapy, Dry needling, Cryotherapy, Education/ Instruction, Electrical stimulation, Gait training, Manual therapy, Neuromuscular re-education, Therapeutic activities, Therapeutic exercises  PT Plan: Skilled PT  PT Frequency: 1 time per week  Duration: 15 total visits (eval + 9 pool and 4-5 land)  Onset Date: 01/27/25  Certification Period Start Date: 01/27/25  Certification Period End Date: 04/27/25  Number of Treatments Authorized: [place]/KAREN cap  Rehab Potential: Fair  Plan of Care Agreement: Patient  Continue with current POC/no changes -- increase shallow water strengthening next session    Assessment of current progress against goals:  Symptomatic relief with treatment    Goals:  Active       PT Problem       PT STG       Start:  01/27/25    Expected End:  03/13/25         - Pt will complete the HEP with <3 verbal cues for correction,   - Pt will demonstrate 2pt improvement on the NPRS, allowing for improved tolerance of functional activities.,   - Pt will demonstrate min losses in all lumbar AROM, without onset of pain, allowing for improved ability to perform functional activities.,   - Pt will demonstrate ability to perform 5 step ups to 8\" step leading with each LE and with 1 UE support in order to demonstrate improved ability to navigate stairs,   - Pt will demonstrate ability to ambulate 300' with least restrictive device and equal stance time bilat in order to begin demoing normalized gait pattern.,   - Pt will demonstrate at least 4+/5 MMT grading throughout LE musculature, allowing for appropriate muscle recruitment during daily activities. ,          PT LTG       Start:  01/27/25    Expected End:  04/27/25       - Pt will be independent in HEP & symptom management,   - Pt will demonstrate 4pt improvement on the NPRS, allowing for improved tolerance of functional activities. ,   - Pt will demonstrate full ROM in all lumbar AROM, without " "onset of pain, allowing for the ability to perform functional activities.,   - Pt will demonstrate ability to ambulate at least 350' with least restrictive/no device without breaks or increases in pain in order to demonstrate improved tolerance to prolonged ambulation.,   - Pt will obtain and maintain a neutral posture throughout PT session in order to allow for proper muscle length-tension relationships and recruitment for daily tasks,   - Pt will demonstrate ability to perform 10 sit to stands without UE use in order to demonstrate improved functional LE strength and improved independence with functional mobility,   - Pt will demonstrate ability to perform 10 step ups to 8\" step leading with each LE and with 1 UE support in order to demonstrate improved ability to navigate stairs,   - Pt will demonstrate improved OSWESTRY score by 13 points (MCID) in order to demonstrate improved functional mobility.     Patient stated goals: \"pain relief in both legs, restore function in walking, standing, climbing stairs\"                      "

## 2025-02-19 ENCOUNTER — TREATMENT (OUTPATIENT)
Dept: PHYSICAL THERAPY | Facility: CLINIC | Age: 70
End: 2025-02-19
Payer: MEDICARE

## 2025-02-19 DIAGNOSIS — M48.062 SPINAL STENOSIS OF LUMBAR REGION WITH NEUROGENIC CLAUDICATION: ICD-10-CM

## 2025-02-19 PROCEDURE — 97113 AQUATIC THERAPY/EXERCISES: CPT | Mod: GP

## 2025-02-19 ASSESSMENT — PAIN SCALES - GENERAL: PAINLEVEL_OUTOF10: 3

## 2025-03-04 NOTE — PROGRESS NOTES
Physical Therapy Treatment    Patient Name: Austyn Alfaro  MRN: [id number]  Encounter date:  3/5/2025  Time Calculation  Start Time: 1215  Stop Time: 1255  Time Calculation (min): 40 min     PT Therapeutic Procedures Time Entry  Aquatic Therapy Time Entry: 40    Visit Number:  5 (including evaluation)  Planned total visits: 15  Visit Authorized/insurance coverage:  Payor: MEDICARE / Plan: MEDICARE PART A AND B / Product Type: *No Product type* /  [place]IC[place] A/B, [place],  [place], NO AUTH ( 0 USED PT/ST)   Progress Report due visit #10        Current Problem  Problem List Items Addressed This Visit    None  Visit Diagnoses         Codes    Spinal stenosis of lumbar region with neurogenic claudication     M48.062             Precautions  Precautions  Precautions Comment: Hx of skin CA    Pain  0-10 (Numeric) Pain Score: 4  Pain Location: Back     Subjective  General  Reason for Referral: Spinal stenosis with neurogenic claudication  Referred By: Tomasz Chodwhury  The pt returns to the clinic stating that he was feeling good this morning, but then did 6/10 of a mile and some bike prior to this session and that made his back sore.      Objective  Significant antalgic gait with weak L hip flexion and flexed trunk posture.    Aquatic Treatment:    4.5' depth: fwd, lat, and retro walking x3 laps holding kick board for resistance       4.5' depth - initially started without UE support - added UE support of noodle in order to ensure ability to maintain upright posture and core activation  Heel and toe raises x10  Hip 3 way x10 ea    4.5' depth march across pool with noodle x2 laps    7' Deep with noodle under arms:  Decompression 2'  Bicycle 2'  Decompression 2'  Abd/Add 2'  Decompression 2'  XC ski 2'  Decompression 2'  DKTC 2'  Decompression 2'  Hip ER 2'  Decompression 2'    Forward walking in 3.5 ft to acclimate to shallow water x4 laps      Current HEP:  HL LTRs       OP EDUCATION:       Assessment:   Pt struggled with  "maintaining proper core activation and upright posture during standing strengthening. Cues needed throughout to encourage core activation.    Areas of improvements:  Decreased pain and tolerance to aquatics.  Limitations/deficits:  Weakness and Pain limits activity     Pain end of session: 2/10 \"mainly just the hip\"    Plan:  OP PT Plan  Treatment/Interventions: Aquatic therapy, Dry needling, Cryotherapy, Education/ Instruction, Electrical stimulation, Gait training, Manual therapy, Neuromuscular re-education, Therapeutic activities, Therapeutic exercises  PT Plan: Skilled PT  PT Frequency: 1 time per week  Duration: 15 total visits (eval + 9 pool and 4-5 land)  Onset Date: 01/27/25  Certification Period Start Date: 01/27/25  Certification Period End Date: 04/27/25  Number of Treatments Authorized: [place]/KAREN cap  Rehab Potential: Fair  Plan of Care Agreement: Patient  Continue with current POC/no changes    Assessment of current progress against goals:  Symptomatic relief with treatment    Goals:  Active       PT Problem       PT STG       Start:  01/27/25    Expected End:  03/13/25         - Pt will complete the HEP with <3 verbal cues for correction,   - Pt will demonstrate 2pt improvement on the NPRS, allowing for improved tolerance of functional activities.,   - Pt will demonstrate min losses in all lumbar AROM, without onset of pain, allowing for improved ability to perform functional activities.,   - Pt will demonstrate ability to perform 5 step ups to 8\" step leading with each LE and with 1 UE support in order to demonstrate improved ability to navigate stairs,   - Pt will demonstrate ability to ambulate 300' with least restrictive device and equal stance time bilat in order to begin demoing normalized gait pattern.,   - Pt will demonstrate at least 4+/5 MMT grading throughout LE musculature, allowing for appropriate muscle recruitment during daily activities. ,          PT LTG       Start:  01/27/25    Expected " "End:  04/27/25       - Pt will be independent in HEP & symptom management,   - Pt will demonstrate 4pt improvement on the NPRS, allowing for improved tolerance of functional activities. ,   - Pt will demonstrate full ROM in all lumbar AROM, without onset of pain, allowing for the ability to perform functional activities.,   - Pt will demonstrate ability to ambulate at least 350' with least restrictive/no device without breaks or increases in pain in order to demonstrate improved tolerance to prolonged ambulation.,   - Pt will obtain and maintain a neutral posture throughout PT session in order to allow for proper muscle length-tension relationships and recruitment for daily tasks,   - Pt will demonstrate ability to perform 10 sit to stands without UE use in order to demonstrate improved functional LE strength and improved independence with functional mobility,   - Pt will demonstrate ability to perform 10 step ups to 8\" step leading with each LE and with 1 UE support in order to demonstrate improved ability to navigate stairs,   - Pt will demonstrate improved OSWESTRY score by 13 points (MCID) in order to demonstrate improved functional mobility.     Patient stated goals: \"pain relief in both legs, restore function in walking, standing, climbing stairs\"                      "

## 2025-03-05 ENCOUNTER — TREATMENT (OUTPATIENT)
Dept: PHYSICAL THERAPY | Facility: CLINIC | Age: 70
End: 2025-03-05
Payer: MEDICARE

## 2025-03-05 DIAGNOSIS — M48.062 SPINAL STENOSIS OF LUMBAR REGION WITH NEUROGENIC CLAUDICATION: ICD-10-CM

## 2025-03-05 PROCEDURE — 97113 AQUATIC THERAPY/EXERCISES: CPT | Mod: GP

## 2025-03-05 ASSESSMENT — PAIN SCALES - GENERAL: PAINLEVEL_OUTOF10: 4

## 2025-03-11 NOTE — PROGRESS NOTES
Physical Therapy Treatment    Patient Name: Austyn Alfaro  MRN: [id number]  Encounter date:  3/12/2025  Time Calculation  Start Time: 1345  Stop Time: 1425  Time Calculation (min): 40 min     PT Therapeutic Procedures Time Entry  Aquatic Therapy Time Entry: 40    Visit Number:  6 (including evaluation)  Planned total visits: 15  Visit Authorized/insurance coverage:  Payor: MEDICARE / Plan: MEDICARE PART A AND B / Product Type: *No Product type* /  [place] A/[place], [place],  [place], [place] AUTH ( 0 USED PT/ST)   Progress Report due visit #10        Current Problem  Problem List Items Addressed This Visit    None  Visit Diagnoses         Codes    Spinal stenosis of lumbar region with neurogenic claudication     M48.062               Precautions  Precautions  Precautions Comment: Hx of skin CA    Pain  0-10 (Numeric) Pain Score: 2  Pain Location: Back     Subjective  General  Reason for Referral: Spinal stenosis with neurogenic claudication  Referred By: Tomasz Chowdhury  The pt returns to the clinic stating that he has worked out twice on his own since last session. He walked on the treadmill, but only made it about 1/2 mile and felt his gait pattern was bad. He has also been utilizing the pool and notes that he feels best in the water.      Objective  Trunk flexion with short steps and decreased heel to toe pattern with gait on pool deck    Aquatic Treatment:    4.5' depth: fwd, lat, and retro walking x3 laps holding kick board for resistance     4.5' depth walking march with tap to green water weight    4.5' depth - UE support of noodle - with upright posture and core activation  Heel and toe raises x10  Hip 3 way x10 ea    7' Deep with noodle under arms:  Decompression 2'  Bicycle 2'  Decompression 2'  Abd/Add 2'  Decompression 2'  XC ski 2'  Decompression 2'  DKTC 2'  Decompression 2'    Forward walking in 3.5 ft to acclimate to shallow water x5 mins (per pt request to work on gait pattern)      Current HEP:  HL LTRs  "      OP EDUCATION:       Assessment:   The pt does demonstrate improved gait pattern in the water, including the ability to maintain an upright posture more effectively, even in shallow depths.   Areas of improvements:  Decreased pain and tolerance to aquatics.  Limitations/deficits:  Weakness and Pain limits activity     Pain end of session: 1/10    Plan:  OP PT Plan  Treatment/Interventions: Aquatic therapy, Dry needling, Cryotherapy, Education/ Instruction, Electrical stimulation, Gait training, Manual therapy, Neuromuscular re-education, Therapeutic activities, Therapeutic exercises  PT Plan: Skilled PT  PT Frequency: 1 time per week  Duration: 15 total visits (eval + 9 pool and 4-5 land)  Onset Date: 01/27/25  Certification Period Start Date: 01/27/25  Certification Period End Date: 04/27/25  Number of Treatments Authorized: [place] cap  Rehab Potential: Fair  Plan of Care Agreement: Patient  Continue with current POC/no changes    Assessment of current progress against goals:  Symptomatic relief with treatment    Goals:  Active       PT Problem       PT STG       Start:  01/27/25    Expected End:  03/13/25         - Pt will complete the HEP with <3 verbal cues for correction,   - Pt will demonstrate 2pt improvement on the NPRS, allowing for improved tolerance of functional activities.,   - Pt will demonstrate min losses in all lumbar AROM, without onset of pain, allowing for improved ability to perform functional activities.,   - Pt will demonstrate ability to perform 5 step ups to 8\" step leading with each LE and with 1 UE support in order to demonstrate improved ability to navigate stairs,   - Pt will demonstrate ability to ambulate 300' with least restrictive device and equal stance time bilat in order to begin demoing normalized gait pattern.,   - Pt will demonstrate at least 4+/5 MMT grading throughout LE musculature, allowing for appropriate muscle recruitment during daily activities. ,          PT LTG  " "     Start:  01/27/25    Expected End:  04/27/25       - Pt will be independent in HEP & symptom management,   - Pt will demonstrate 4pt improvement on the NPRS, allowing for improved tolerance of functional activities. ,   - Pt will demonstrate full ROM in all lumbar AROM, without onset of pain, allowing for the ability to perform functional activities.,   - Pt will demonstrate ability to ambulate at least 350' with least restrictive/no device without breaks or increases in pain in order to demonstrate improved tolerance to prolonged ambulation.,   - Pt will obtain and maintain a neutral posture throughout PT session in order to allow for proper muscle length-tension relationships and recruitment for daily tasks,   - Pt will demonstrate ability to perform 10 sit to stands without UE use in order to demonstrate improved functional LE strength and improved independence with functional mobility,   - Pt will demonstrate ability to perform 10 step ups to 8\" step leading with each LE and with 1 UE support in order to demonstrate improved ability to navigate stairs,   - Pt will demonstrate improved OSWESTRY score by 13 points (MCID) in order to demonstrate improved functional mobility.     Patient stated goals: \"pain relief in both legs, restore function in walking, standing, climbing stairs\"                      "

## 2025-03-12 ENCOUNTER — TREATMENT (OUTPATIENT)
Dept: PHYSICAL THERAPY | Facility: CLINIC | Age: 70
End: 2025-03-12
Payer: MEDICARE

## 2025-03-12 DIAGNOSIS — M48.062 SPINAL STENOSIS OF LUMBAR REGION WITH NEUROGENIC CLAUDICATION: ICD-10-CM

## 2025-03-12 PROCEDURE — 97113 AQUATIC THERAPY/EXERCISES: CPT | Mod: GP

## 2025-03-12 ASSESSMENT — PAIN SCALES - GENERAL: PAINLEVEL_OUTOF10: 2

## 2025-03-18 NOTE — PROGRESS NOTES
"    Physical Therapy Treatment    Patient Name: Austyn Alfaro  MRN: [id number]  Encounter date:  3/19/2025  Time Calculation  Start Time: 1345  Stop Time: 1429  Time Calculation (min): 44 min     PT Therapeutic Procedures Time Entry  Aquatic Therapy Time Entry: 44    Visit Number:  7 (including evaluation)  Planned total visits: 15  Visit Authorized/insurance coverage:  Payor: MEDICARE / Plan: MEDICARE PART A AND B / Product Type: *No Product type* /  [place] A/[place], [place],  [place], [place] AUTH ( 0 USED PT/ST)   Progress Report due visit #10        Current Problem  Problem List Items Addressed This Visit    None  Visit Diagnoses         Codes    Spinal stenosis of lumbar region with neurogenic claudication     M48.062                 Precautions  Precautions  Precautions Comment: Hx of skin CA    Pain  0-10 (Numeric) Pain Score:  (0 at rest, 5 with movement)     Subjective  General  Reason for Referral: Spinal stenosis with neurogenic claudication  Referred By: Tomasz Chowdhury  The pt returns stating that he was tired after last session, but felt that it was a good intensity with the walking. He notes feeling like he has regressed a little bit this week, feeling like he lost a little flexibility and has felt \"sluggish\"      Objective  Trunk flexion with short steps and decreased heel to toe pattern with gait on pool deck    Aquatic Treatment:    4.5' depth: fwd, lat, and retro walking x3 laps holding hand paddles      4.5' depth walking march with tap to green water weight x1 lap    4.5' depth - UE support of noodle - with upright posture and core activation  Heel and toe raises x10  Hip 3 way x10 ea    7' Deep with noodle under arms:  Decompression 2'  Bicycle 2'  Decompression 2'  Abd/Add 2'  Decompression 2'  XC ski 2'  Decompression 2'  DKTC 2'  Decompression 2'    Forward walking in 3.5 ft to acclimate to shallow water x5 mins (per pt request to work on gait pattern)    At steps:  Hamstring stretch 20\" x2    Current " "HEP:  HL LTRs       OP EDUCATION:       Assessment:   The pt demonstrates improved core stability with standing exercises, despite reduced UE support in shallow end.   Areas of improvements:  Decreased pain and tolerance to aquatics.  Limitations/deficits:  Weakness and Pain limits activity     Pain end of session: 0/10    Plan:  OP PT Plan  Treatment/Interventions: Aquatic therapy, Dry needling, Cryotherapy, Education/ Instruction, Electrical stimulation, Gait training, Manual therapy, Neuromuscular re-education, Therapeutic activities, Therapeutic exercises  PT Plan: Skilled PT  PT Frequency: 1 time per week  Duration: 15 total visits (eval + 9 pool and 4-5 land)  Onset Date: 01/27/25  Certification Period Start Date: 01/27/25  Certification Period End Date: 04/27/25  Number of Treatments Authorized: [place]/MC cap  Rehab Potential: Fair  Plan of Care Agreement: Patient  Continue with current POC/no changes    Assessment of current progress against goals:  Symptomatic relief with treatment    Goals:  Active       PT Problem       PT STG       Start:  01/27/25    Expected End:  03/13/25         - Pt will complete the HEP with <3 verbal cues for correction,   - Pt will demonstrate 2pt improvement on the NPRS, allowing for improved tolerance of functional activities.,   - Pt will demonstrate min losses in all lumbar AROM, without onset of pain, allowing for improved ability to perform functional activities.,   - Pt will demonstrate ability to perform 5 step ups to 8\" step leading with each LE and with 1 UE support in order to demonstrate improved ability to navigate stairs,   - Pt will demonstrate ability to ambulate 300' with least restrictive device and equal stance time bilat in order to begin demoing normalized gait pattern.,   - Pt will demonstrate at least 4+/5 MMT grading throughout LE musculature, allowing for appropriate muscle recruitment during daily activities. ,          PT LTG       Start:  01/27/25    " "Expected End:  04/27/25       - Pt will be independent in HEP & symptom management,   - Pt will demonstrate 4pt improvement on the NPRS, allowing for improved tolerance of functional activities. ,   - Pt will demonstrate full ROM in all lumbar AROM, without onset of pain, allowing for the ability to perform functional activities.,   - Pt will demonstrate ability to ambulate at least 350' with least restrictive/no device without breaks or increases in pain in order to demonstrate improved tolerance to prolonged ambulation.,   - Pt will obtain and maintain a neutral posture throughout PT session in order to allow for proper muscle length-tension relationships and recruitment for daily tasks,   - Pt will demonstrate ability to perform 10 sit to stands without UE use in order to demonstrate improved functional LE strength and improved independence with functional mobility,   - Pt will demonstrate ability to perform 10 step ups to 8\" step leading with each LE and with 1 UE support in order to demonstrate improved ability to navigate stairs,   - Pt will demonstrate improved OSWESTRY score by 13 points (MCID) in order to demonstrate improved functional mobility.     Patient stated goals: \"pain relief in both legs, restore function in walking, standing, climbing stairs\"                      "

## 2025-03-19 ENCOUNTER — TREATMENT (OUTPATIENT)
Dept: PHYSICAL THERAPY | Facility: CLINIC | Age: 70
End: 2025-03-19
Payer: MEDICARE

## 2025-03-19 DIAGNOSIS — M48.062 SPINAL STENOSIS OF LUMBAR REGION WITH NEUROGENIC CLAUDICATION: ICD-10-CM

## 2025-03-19 PROCEDURE — 97113 AQUATIC THERAPY/EXERCISES: CPT | Mod: GP

## 2025-03-25 NOTE — PROGRESS NOTES
"    Physical Therapy Treatment    Patient Name: Austyn Alfaro  MRN: [id number]  Encounter date:  3/26/2025  Time Calculation  Start Time: 1345  Stop Time: 1427  Time Calculation (min): 42 min     PT Therapeutic Procedures Time Entry  Aquatic Therapy Time Entry: 42    Visit Number:  8 (including evaluation)  Planned total visits: 15  Visit Authorized/insurance coverage:  Payor: MEDICARE / Plan: MEDICARE PART A AND B / Product Type: *No Product type* /  [place] A/[place], [place],  [place], [place] AUTH ( 0 USED PT/ST)   Progress Report due visit #10        Current Problem  Problem List Items Addressed This Visit    None  Visit Diagnoses         Codes    Spinal stenosis of lumbar region with neurogenic claudication     M48.062                   Precautions  Precautions  Precautions Comment: Hx of skin CA    Pain  0-10 (Numeric) Pain Score: 6     Subjective  General  Reason for Referral: Spinal stenosis with neurogenic claudication  Referred By: Tomasz Chowdhury  The pt returns stating that he has a lot of pain today and is unsure why. He does report that he wore his new AFO for the first time yesterday for about an hour.      Objective  Trunk flexion with short steps and decreased heel to toe pattern with gait on pool deck    Aquatic Treatment:    4.5' depth: fwd, lat, and retro walking x3 laps     7' Deep with noodle under arms:  Decompression 2'  Bicycle 2'  Decompression 2'  Abd/Add 2'  Decompression 2'  XC ski 2'  Decompression 2'  SKTC 2'  Decompression 2'  Repeated sequence twice    Forward walking in 3.5 ft to acclimate to shallow water x5 mins (per pt request to work on gait pattern)    At steps:  Hamstring stretch 30\" x2    Current HEP:  HL LTRs       OP EDUCATION:       Assessment:   Increased deep water exercise this date d/t heightened pain. Pt did note reduction in symptoms with deep water intervention that remained after exiting the pool.  Areas of improvements:  Decreased pain and tolerance to " "aquatics.  Limitations/deficits:  Weakness and Pain limits activity     Pain end of session: 3/10    Plan:  OP PT Plan  Treatment/Interventions: Aquatic therapy, Dry needling, Cryotherapy, Education/ Instruction, Electrical stimulation, Gait training, Manual therapy, Neuromuscular re-education, Therapeutic activities, Therapeutic exercises  PT Plan: Skilled PT  PT Frequency: 1 time per week  Duration: 15 total visits (eval + 9 pool and 4-5 land)  Onset Date: 01/27/25  Certification Period Start Date: 01/27/25  Certification Period End Date: 04/27/25  Number of Treatments Authorized: [place]/MC cap  Rehab Potential: Fair  Plan of Care Agreement: Patient  Continue with current POC/no changes    Assessment of current progress against goals:  Symptomatic relief with treatment    Goals:  Active       PT Problem       PT STG       Start:  01/27/25    Expected End:  03/13/25         - Pt will complete the HEP with <3 verbal cues for correction,   - Pt will demonstrate 2pt improvement on the NPRS, allowing for improved tolerance of functional activities.,   - Pt will demonstrate min losses in all lumbar AROM, without onset of pain, allowing for improved ability to perform functional activities.,   - Pt will demonstrate ability to perform 5 step ups to 8\" step leading with each LE and with 1 UE support in order to demonstrate improved ability to navigate stairs,   - Pt will demonstrate ability to ambulate 300' with least restrictive device and equal stance time bilat in order to begin demoing normalized gait pattern.,   - Pt will demonstrate at least 4+/5 MMT grading throughout LE musculature, allowing for appropriate muscle recruitment during daily activities. ,          PT LTG       Start:  01/27/25    Expected End:  04/27/25       - Pt will be independent in HEP & symptom management,   - Pt will demonstrate 4pt improvement on the NPRS, allowing for improved tolerance of functional activities. ,   - Pt will demonstrate full ROM " "in all lumbar AROM, without onset of pain, allowing for the ability to perform functional activities.,   - Pt will demonstrate ability to ambulate at least 350' with least restrictive/no device without breaks or increases in pain in order to demonstrate improved tolerance to prolonged ambulation.,   - Pt will obtain and maintain a neutral posture throughout PT session in order to allow for proper muscle length-tension relationships and recruitment for daily tasks,   - Pt will demonstrate ability to perform 10 sit to stands without UE use in order to demonstrate improved functional LE strength and improved independence with functional mobility,   - Pt will demonstrate ability to perform 10 step ups to 8\" step leading with each LE and with 1 UE support in order to demonstrate improved ability to navigate stairs,   - Pt will demonstrate improved OSWESTRY score by 13 points (MCID) in order to demonstrate improved functional mobility.     Patient stated goals: \"pain relief in both legs, restore function in walking, standing, climbing stairs\"                      "

## 2025-03-26 ENCOUNTER — TREATMENT (OUTPATIENT)
Dept: PHYSICAL THERAPY | Facility: CLINIC | Age: 70
End: 2025-03-26
Payer: MEDICARE

## 2025-03-26 DIAGNOSIS — M48.062 SPINAL STENOSIS OF LUMBAR REGION WITH NEUROGENIC CLAUDICATION: ICD-10-CM

## 2025-03-26 PROCEDURE — 97113 AQUATIC THERAPY/EXERCISES: CPT | Mod: GP

## 2025-03-26 ASSESSMENT — PAIN SCALES - GENERAL: PAINLEVEL_OUTOF10: 6

## 2025-04-09 ENCOUNTER — TREATMENT (OUTPATIENT)
Dept: PHYSICAL THERAPY | Facility: CLINIC | Age: 70
End: 2025-04-09
Payer: MEDICARE

## 2025-04-09 DIAGNOSIS — M48.062 SPINAL STENOSIS OF LUMBAR REGION WITH NEUROGENIC CLAUDICATION: ICD-10-CM

## 2025-04-09 PROCEDURE — 97113 AQUATIC THERAPY/EXERCISES: CPT | Mod: GP,CQ

## 2025-04-09 ASSESSMENT — PAIN SCALES - GENERAL: PAINLEVEL_OUTOF10: 3

## 2025-04-09 NOTE — PROGRESS NOTES
"    Physical Therapy Treatment    Patient Name: Austyn Alfaro  MRN: [id number]  Encounter date:  4/9/2025  Time Calculation  Start Time: 1345  Stop Time: 1428  Time Calculation (min): 43 min          Visit Number:  9 (including evaluation)  Planned total visits: 15  Visit Authorized/insurance coverage:  Payor: MEDICARE / Plan: MEDICARE PART A AND B / Product Type: *No Product type* /  [place]ICARE A/B, [place],  [place], NO AUTH ( 0 USED PT/ST)   Progress Report due visit #10        Current Problem  Problem List Items Addressed This Visit    None  Visit Diagnoses         Codes    Spinal stenosis of lumbar region with neurogenic claudication     M48.062             Precautions  Precautions  Precautions Comment: Hx of skin CA    Pain  0-10 (Numeric) Pain Score: 3  Pain Location: Hip  Pain Orientation: Left   0?10 back pain    Subjective  General   Patient reports that he was in [place] this weekend and walked a lot with pain in L hip reachin 7-8/10 at times.        Objective  Trunk flexion with short steps and decreased heel to toe pattern with gait on pool deck        Aquatic Treatment:    4.5' depth: fwd, lat, and retro walking x3 laps    MIP x20  Clamshells 2 x10 R/L         7' Deep with noodle under arms:  Decompression 2'  Bicycle 2'  Decompression 2'  Abd/Add 2'  Decompression 2'  XC ski 2'  Decompression 2'  SKTC 2'  Decompression 2'  DKTC 2'  Repeated sequence twice    Forward walking in 3.5 ft to acclimate to shallow water x5 mins (per pt request to work on gait pattern)    At steps:  Hamstring stretch 30\" x2    Current HEP:  HL LTRs       OP EDUCATION:       Assessment:  No increase in sx after adding clamshells today or DKTC.  Areas of improvements:  Decreased pain and tolerance to aquatics.  Limitations/deficits:  Weakness and Pain limits activity     Pain end of session: 2/10    Plan:     Continue with current POC/no changes    Assessment of current progress against goals:  Symptomatic relief with " "treatment    Goals:  Active       PT Problem       PT STG       Start:  01/27/25    Expected End:  03/13/25         - Pt will complete the HEP with <3 verbal cues for correction,   - Pt will demonstrate 2pt improvement on the NPRS, allowing for improved tolerance of functional activities.,   - Pt will demonstrate min losses in all lumbar AROM, without onset of pain, allowing for improved ability to perform functional activities.,   - Pt will demonstrate ability to perform 5 step ups to 8\" step leading with each LE and with 1 UE support in order to demonstrate improved ability to navigate stairs,   - Pt will demonstrate ability to ambulate 300' with least restrictive device and equal stance time bilat in order to begin demoing normalized gait pattern.,   - Pt will demonstrate at least 4+/5 MMT grading throughout LE musculature, allowing for appropriate muscle recruitment during daily activities. ,          PT LTG       Start:  01/27/25    Expected End:  04/27/25       - Pt will be independent in HEP & symptom management,   - Pt will demonstrate 4pt improvement on the NPRS, allowing for improved tolerance of functional activities. ,   - Pt will demonstrate full ROM in all lumbar AROM, without onset of pain, allowing for the ability to perform functional activities.,   - Pt will demonstrate ability to ambulate at least 350' with least restrictive/no device without breaks or increases in pain in order to demonstrate improved tolerance to prolonged ambulation.,   - Pt will obtain and maintain a neutral posture throughout PT session in order to allow for proper muscle length-tension relationships and recruitment for daily tasks,   - Pt will demonstrate ability to perform 10 sit to stands without UE use in order to demonstrate improved functional LE strength and improved independence with functional mobility,   - Pt will demonstrate ability to perform 10 step ups to 8\" step leading with each LE and with 1 UE support in " "order to demonstrate improved ability to navigate stairs,   - Pt will demonstrate improved OSWESTRY score by 13 points (MCID) in order to demonstrate improved functional mobility.     Patient stated goals: \"pain relief in both legs, restore function in walking, standing, climbing stairs\"                      "

## 2025-04-10 ENCOUNTER — APPOINTMENT (OUTPATIENT)
Dept: PRIMARY CARE | Facility: CLINIC | Age: 70
End: 2025-04-10
Payer: MEDICARE

## 2025-04-10 VITALS
WEIGHT: 215 LBS | SYSTOLIC BLOOD PRESSURE: 110 MMHG | HEART RATE: 62 BPM | OXYGEN SATURATION: 97 % | TEMPERATURE: 97.3 F | BODY MASS INDEX: 27.59 KG/M2 | DIASTOLIC BLOOD PRESSURE: 70 MMHG | HEIGHT: 74 IN

## 2025-04-10 DIAGNOSIS — T75.3XXA MOTION SICKNESS, INITIAL ENCOUNTER: ICD-10-CM

## 2025-04-10 DIAGNOSIS — Z23 NEED FOR PNEUMOCOCCAL VACCINE: ICD-10-CM

## 2025-04-10 DIAGNOSIS — I82.432 ACUTE DEEP VEIN THROMBOSIS (DVT) OF POPLITEAL VEIN OF LEFT LOWER EXTREMITY: Primary | ICD-10-CM

## 2025-04-10 DIAGNOSIS — E78.00 PURE HYPERCHOLESTEROLEMIA: ICD-10-CM

## 2025-04-10 PROCEDURE — 90677 PCV20 VACCINE IM: CPT | Performed by: INTERNAL MEDICINE

## 2025-04-10 PROCEDURE — 1125F AMNT PAIN NOTED PAIN PRSNT: CPT | Performed by: INTERNAL MEDICINE

## 2025-04-10 PROCEDURE — G0009 ADMIN PNEUMOCOCCAL VACCINE: HCPCS | Performed by: INTERNAL MEDICINE

## 2025-04-10 PROCEDURE — 1123F ACP DISCUSS/DSCN MKR DOCD: CPT | Performed by: INTERNAL MEDICINE

## 2025-04-10 PROCEDURE — 1158F ADVNC CARE PLAN TLK DOCD: CPT | Performed by: INTERNAL MEDICINE

## 2025-04-10 PROCEDURE — 3008F BODY MASS INDEX DOCD: CPT | Performed by: INTERNAL MEDICINE

## 2025-04-10 PROCEDURE — 1036F TOBACCO NON-USER: CPT | Performed by: INTERNAL MEDICINE

## 2025-04-10 PROCEDURE — 1160F RVW MEDS BY RX/DR IN RCRD: CPT | Performed by: INTERNAL MEDICINE

## 2025-04-10 PROCEDURE — 1159F MED LIST DOCD IN RCRD: CPT | Performed by: INTERNAL MEDICINE

## 2025-04-10 PROCEDURE — 99214 OFFICE O/P EST MOD 30 MIN: CPT | Performed by: INTERNAL MEDICINE

## 2025-04-10 RX ORDER — PREGABALIN 150 MG/1
1 CAPSULE ORAL
COMMUNITY
Start: 2025-03-07

## 2025-04-10 RX ORDER — KETOCONAZOLE 20 MG/G
CREAM TOPICAL
COMMUNITY
Start: 2025-03-17

## 2025-04-10 RX ORDER — GABAPENTIN 800 MG/1
1 TABLET ORAL
COMMUNITY
Start: 2025-01-29 | End: 2025-04-17 | Stop reason: WASHOUT

## 2025-04-10 RX ORDER — SCOPOLAMINE 1 MG/3D
1 PATCH, EXTENDED RELEASE TRANSDERMAL
Qty: 10 PATCH | Refills: 0 | Status: SHIPPED | OUTPATIENT
Start: 2025-04-10 | End: 2025-06-09

## 2025-04-10 RX ORDER — CYCLOBENZAPRINE HCL 10 MG
TABLET ORAL
COMMUNITY
Start: 2025-01-22 | End: 2025-04-17 | Stop reason: WASHOUT

## 2025-04-10 ASSESSMENT — PATIENT HEALTH QUESTIONNAIRE - PHQ9
SUM OF ALL RESPONSES TO PHQ9 QUESTIONS 1 AND 2: 0
2. FEELING DOWN, DEPRESSED OR HOPELESS: NOT AT ALL
1. LITTLE INTEREST OR PLEASURE IN DOING THINGS: NOT AT ALL

## 2025-04-10 ASSESSMENT — PAIN SCALES - GENERAL: PAINLEVEL_OUTOF10: 4

## 2025-04-15 NOTE — PROGRESS NOTES
"    Physical Therapy Progress Report & Treatment    Patient Name: Austyn Alfaro  MRN: [id number]  Encounter date:  4/16/2025  Time Calculation  Start Time: 1020  Stop Time: 1105  Time Calculation (min): 45 min     PT Therapeutic Procedures Time Entry  Manual Therapy Time Entry: 30  Therapeutic Exercise Time Entry: 5    Visit Number:  10 (including evaluation)  Planned total visits: 15  Visit Authorized/insurance coverage:  Payor: MEDICARE / Plan: MEDICARE PART A AND B / Product Type: *No Product type* /  [place], [place], [place] AUTH ( 0 USED PT/ST)   Progress Report due visit #10        Current Problem  Problem List Items Addressed This Visit    None  Visit Diagnoses         Codes      Spinal stenosis of lumbar region with neurogenic claudication     M48.062               Precautions  Precautions  Precautions Comment: Hx of skin CA    Pain  0-10 (Numeric) Pain Score: 3 (8-9/10 with ambulation)  Pain Location: Hip  Pain Orientation: Left     Subjective  General  Reason for Referral: Spinal stenosis with neurogenic claudication  Referred By: Tomasz Chowdhury  The pt returns to the clinic stating that over the last week, his L hip pain has increased significantly and he has been having to utilize bilat axillary crutches or a SPC to off weight the L hip. He notes that he is seeing pain management tomorrow and trying to decide if he should move forward with a hip injection next week or plan to have a TK.       Objective  Lumbar AROM Range   Flexion No limitation - slow to move - no change    Extension No limitation \"no change\"   R sidebend 25% limitation some radiations into the R thigh   L sidebend 25% limitation - tightness in L sided low back       Hip MMT L R   Hip Flexion 4/5* 5/5   Hip Extension 4/5* 4+/5 \"anterior L hip pain\"   Hip Abduction 4-/5* 5/5   External Rotation 4+/5* 5/5   Internal Rotation 4+/5* 5/5      Knee MMT L R   Knee Flexion 5/5 5/5   Knee Extension 5/5 5/5      Ankle MMT L R " "  Dorsiflexion 5/5 5/5   Plantarflexion 5/5 5/5     The pt ambulates into the clinic mod I with bilat axillary crutches. The pt ambulates with WBAT on the L LE.   At the end of the session, the pt was able to demonstrate ambulation indep without assistive devices. The pt demonstrates significantly antalgic gait on the L LE.    Other Measures  Oswestry Disablity Index (LAURENT): 24    Treatment:  Manual:   Supine L LE LAD with gentle oscillations (grade III) - pt guarding with increased pressure, so kept gentle  Supine STM/MFR through the L hip flexor   R sidelying for STM with MFR through the L glutes, TFL, hip flexors, quad, and hamstrings  Supine L hip inferior glide with L LE positioned in 90/90 (grade III)  Repeated supine L LE LAD with gentle oscaillations (grade III)    Ther-ex:  Supine small ROM PPT with 1-3\" hold x15  HL LTR x20    Current HEP:  HL LTRs       OP EDUCATION:       Assessment:  Pt has completed 10 aquatic PT sessions to address low back pain with radicular symptoms and decreased function. Pt has made progress in ROM, strength, and activity tolerance but remains with deficits in each category. The pt has had a recent flare up of L hip pain, which has been limiting his function more than usual, however the pt continues to be motivated to improve his strength and function and continue managing his radicular symptoms while he prepares for a future TK. Pt would benefit from continued skilled PT to manage symptoms and improve function.      In session, the pt tolerated manual interventions well and had significant improvement with pain during ambulation upon exit.     Areas of improvements:  AROM, Improved strength, and improved tolerance to gait by the end of the session  Limitations/deficits:  Weakness and Pain limits activity     Pain end of session: 3/10 at rest, 2-3/10 with ambulation    Plan:  OP PT Plan  Treatment/Interventions: Aquatic therapy, Dry needling, Cryotherapy, Education/ Instruction, " "Electrical stimulation, Gait training, Manual therapy, Neuromuscular re-education, Therapeutic activities, Therapeutic exercises  PT Plan: Skilled PT  PT Frequency: 1 time per week  Duration: 15 total visits (eval + 9 pool and 4-5 land)  Onset Date: 01/27/25  Certification Period Start Date: 01/27/25  Certification Period End Date: 04/27/25  Number of Treatments Authorized: [place] cap  Rehab Potential: Fair  Plan of Care Agreement: Patient  Continue with current POC with the following changes continue with remaining scheduled sessions - either pool or land (whichever pt finds most tolerable based on current set back with L hip), then anticipate hold while pt travels.     Assessment of current progress against goals:  Pt improving but progress is slow; also increase in L hip symptoms recently    Goals:  Active       PT Problem       PT STG (Progressing)       Start:  01/27/25    Expected End:  03/13/25         - Pt will complete the HEP with <3 verbal cues for correction,   - Pt will demonstrate 2pt improvement on the NPRS, allowing for improved tolerance of functional activities.,   - Pt will demonstrate min losses in all lumbar AROM, without onset of pain, allowing for improved ability to perform functional activities.,   - Pt will demonstrate ability to perform 5 step ups to 8\" step leading with each LE and with 1 UE support in order to demonstrate improved ability to navigate stairs,   - Pt will demonstrate ability to ambulate 300' with least restrictive device and equal stance time bilat in order to begin demoing normalized gait pattern.,   - Pt will demonstrate at least 4+/5 MMT grading throughout LE musculature, allowing for appropriate muscle recruitment during daily activities. ,       Goal Note       Limited ability to assess steps and ambulation distances d/t pts heightened pain this date.               PT LTG (Progressing)       Start:  01/27/25    Expected End:  04/27/25       - Pt will be independent in " "HEP & symptom management,   - Pt will demonstrate 4pt improvement on the NPRS, allowing for improved tolerance of functional activities. ,   - Pt will demonstrate full ROM in all lumbar AROM, without onset of pain, allowing for the ability to perform functional activities.,   - Pt will demonstrate ability to ambulate at least 350' with least restrictive/no device without breaks or increases in pain in order to demonstrate improved tolerance to prolonged ambulation.,   - Pt will obtain and maintain a neutral posture throughout PT session in order to allow for proper muscle length-tension relationships and recruitment for daily tasks,   - Pt will demonstrate ability to perform 10 sit to stands without UE use in order to demonstrate improved functional LE strength and improved independence with functional mobility,   - Pt will demonstrate ability to perform 10 step ups to 8\" step leading with each LE and with 1 UE support in order to demonstrate improved ability to navigate stairs,   - Pt will demonstrate improved OSWESTRY score by 13 points (MCID) in order to demonstrate improved functional mobility.     Patient stated goals: \"pain relief in both legs, restore function in walking, standing, climbing stairs\"                      "

## 2025-04-16 ENCOUNTER — TREATMENT (OUTPATIENT)
Dept: PHYSICAL THERAPY | Facility: CLINIC | Age: 70
End: 2025-04-16
Payer: MEDICARE

## 2025-04-16 DIAGNOSIS — M48.062 SPINAL STENOSIS OF LUMBAR REGION WITH NEUROGENIC CLAUDICATION: ICD-10-CM

## 2025-04-16 PROCEDURE — 97140 MANUAL THERAPY 1/> REGIONS: CPT | Mod: GP

## 2025-04-16 ASSESSMENT — PAIN SCALES - GENERAL: PAINLEVEL_OUTOF10: 3

## 2025-04-17 PROBLEM — I48.0 PAROXYSMAL ATRIAL FIBRILLATION (MULTI): Status: RESOLVED | Noted: 2024-10-25 | Resolved: 2025-04-17

## 2025-04-17 PROBLEM — K40.90 REDUCIBLE INGUINAL HERNIA: Status: RESOLVED | Noted: 2018-10-26 | Resolved: 2025-04-17

## 2025-04-17 PROBLEM — M54.2 CERVICAL PAIN (NECK): Status: RESOLVED | Noted: 2024-10-25 | Resolved: 2025-04-17

## 2025-04-17 PROBLEM — D17.9 LIPOMA: Status: RESOLVED | Noted: 2023-07-06 | Resolved: 2025-04-17

## 2025-04-17 ASSESSMENT — ENCOUNTER SYMPTOMS
VOICE CHANGE: 0
POLYPHAGIA: 0
FATIGUE: 0
SEIZURES: 0
WHEEZING: 0
PALPITATIONS: 0
ACTIVITY CHANGE: 0
SPEECH DIFFICULTY: 0
FREQUENCY: 0
NECK STIFFNESS: 0
HEMATURIA: 0
FLANK PAIN: 0
SINUS PRESSURE: 0
EYE DISCHARGE: 0
HALLUCINATIONS: 0
SORE THROAT: 0
DYSPHORIC MOOD: 0
BLOOD IN STOOL: 0
FEVER: 0
TROUBLE SWALLOWING: 0
ARTHRALGIAS: 0
BACK PAIN: 0
DIZZINESS: 0
PHOTOPHOBIA: 0
CONSTIPATION: 0
HEADACHES: 0
DIARRHEA: 0
ABDOMINAL DISTENTION: 0
STRIDOR: 0
NERVOUS/ANXIOUS: 0
POLYDIPSIA: 0
CHOKING: 0
BRUISES/BLEEDS EASILY: 0
APPETITE CHANGE: 0
FACIAL ASYMMETRY: 0
COLOR CHANGE: 0
NUMBNESS: 0
CONFUSION: 0
NAUSEA: 0
MYALGIAS: 0
ABDOMINAL PAIN: 0
EYE REDNESS: 0
DYSURIA: 0
SINUS PAIN: 0
CHEST TIGHTNESS: 0
DIFFICULTY URINATING: 0
COUGH: 0
VOMITING: 0
SHORTNESS OF BREATH: 0
RHINORRHEA: 0
WEAKNESS: 0

## 2025-04-17 NOTE — PROGRESS NOTES
Subjective   Patient ID: Austyn Alfaro is a 69 y.o. male who presents for Follow-up.    HPI   69-year-old male who presented to the office for follow-up.  He is going on a cruise ship and needed medication for motion sickness.    He also had acute deep vein thrombosis in left lower extremity-he is on apixaban 5 mg twice a day.    Pure hypercholesterolemia-LDL level checked in September 2024 was 159 with normal triglyceride.  Patient is not on any medication to lower the cholesterol.    He is taking all of his medications without any side effects.  He is also following up with Urology and has slightly high PSA level.     Review of Systems   Constitutional:  Negative for activity change, appetite change, fatigue and fever.   HENT:  Negative for congestion, dental problem, ear pain, hearing loss, rhinorrhea, sinus pressure, sinus pain, sore throat, trouble swallowing and voice change.    Eyes:  Negative for photophobia, discharge, redness and visual disturbance.   Respiratory:  Negative for cough, choking, chest tightness, shortness of breath, wheezing and stridor.    Cardiovascular:  Negative for chest pain, palpitations and leg swelling.   Gastrointestinal:  Negative for abdominal distention, abdominal pain, blood in stool, constipation, diarrhea, nausea and vomiting.   Endocrine: Negative for polydipsia, polyphagia and polyuria.   Genitourinary:  Negative for difficulty urinating, dysuria, flank pain, frequency, hematuria and urgency.   Musculoskeletal:  Negative for arthralgias, back pain, myalgias and neck stiffness.   Skin:  Negative for color change and rash.   Allergic/Immunologic: Negative for immunocompromised state.   Neurological:  Negative for dizziness, seizures, syncope, facial asymmetry, speech difficulty, weakness, numbness and headaches.   Hematological:  Does not bruise/bleed easily.   Psychiatric/Behavioral:  Negative for behavioral problems, confusion, dysphoric mood, hallucinations and suicidal  "ideas. The patient is not nervous/anxious.      Objective   /70   Pulse 62   Temp 36.3 °C (97.3 °F)   Ht 1.88 m (6' 2\")   Wt 97.5 kg (215 lb)   SpO2 97%   BMI 27.60 kg/m²     Physical Exam  Vitals and nursing note reviewed.   Constitutional:       General: He is not in acute distress.     Appearance: Normal appearance. He is not ill-appearing or toxic-appearing.   HENT:      Head: Normocephalic and atraumatic.      Nose: Nose normal. No congestion.      Mouth/Throat:      Mouth: Mucous membranes are moist.   Eyes:      General: No scleral icterus.     Conjunctiva/sclera: Conjunctivae normal.      Pupils: Pupils are equal, round, and reactive to light.   Cardiovascular:      Rate and Rhythm: Normal rate and regular rhythm.      Pulses: Normal pulses.      Heart sounds: Normal heart sounds. No murmur heard.  Pulmonary:      Effort: Pulmonary effort is normal. No respiratory distress.      Breath sounds: Normal breath sounds. No stridor. No wheezing, rhonchi or rales.   Abdominal:      General: Bowel sounds are normal.      Tenderness: There is no abdominal tenderness. There is no right CVA tenderness or left CVA tenderness.   Musculoskeletal:         General: No swelling or deformity. Normal range of motion.      Cervical back: Normal range of motion and neck supple. No rigidity or tenderness.      Right lower leg: No edema.      Left lower leg: No edema.   Lymphadenopathy:      Cervical: No cervical adenopathy.   Skin:     General: Skin is warm.      Coloration: Skin is not jaundiced.      Findings: No erythema or lesion.   Neurological:      General: No focal deficit present.      Mental Status: He is alert and oriented to person, place, and time.      Cranial Nerves: No cranial nerve deficit.      Motor: No weakness.      Coordination: Coordination normal.      Gait: Gait normal.   Psychiatric:         Mood and Affect: Mood normal.         Behavior: Behavior normal.         Thought Content: Thought " content normal.         Judgment: Judgment normal.       Assessment/Plan   Problem List Items Addressed This Visit       Pure hypercholesterolemia    Untreated high cholesterol increases the risk of stroke/ TIA/ CAD/ Heart attack/ Coronary artery and carotid artery plaques etc.   You should consider medical treatment to decrease it as its high from long time.  As far as I can see from past 5 years.         Acute deep vein thrombosis (DVT) of popliteal vein of left lower extremity - Primary    Continue Eliquis.          Other Visit Diagnoses         Motion sickness, initial encounter        Relevant Medications    scopolamine (Transderm-Scop) 1 mg over 3 days patch 3 day      Need for pneumococcal vaccine        Relevant Orders    Pneumococcal conjugate vaccine, 20-valent (PREVNAR 20) (Completed)          Return to clinic at your next office visit.

## 2025-04-17 NOTE — ASSESSMENT & PLAN NOTE
Untreated high cholesterol increases the risk of stroke/ TIA/ CAD/ Heart attack/ Coronary artery and carotid artery plaques etc.   You should consider medical treatment to decrease it as its high from long time.  As far as I can see from past 5 years.

## 2025-04-23 ENCOUNTER — TREATMENT (OUTPATIENT)
Dept: PHYSICAL THERAPY | Facility: CLINIC | Age: 70
End: 2025-04-23
Payer: MEDICARE

## 2025-04-23 DIAGNOSIS — M48.062 SPINAL STENOSIS OF LUMBAR REGION WITH NEUROGENIC CLAUDICATION: ICD-10-CM

## 2025-04-23 PROCEDURE — 97113 AQUATIC THERAPY/EXERCISES: CPT | Mod: GP

## 2025-04-23 ASSESSMENT — PAIN SCALES - GENERAL: PAINLEVEL_OUTOF10: 7

## 2025-04-23 NOTE — PROGRESS NOTES
"    Physical Therapy Treatment    Patient Name: Austyn Alfaro  MRN: [id number]  Encounter date:  4/23/2025  Time Calculation  Start Time: 1500  Stop Time: 1542  Time Calculation (min): 42 min     PT Therapeutic Procedures Time Entry  Aquatic Therapy Time Entry: 42    Visit Number:  11 (including evaluation)  Planned total visits: 15  Visit Authorized/insurance coverage:  Payor: MEDICARE / Plan: MEDICARE PART A AND B / Product Type: *No Product type* /  [place], [place],  [place], [place] AUTH ( 0 USED PT/ST)   Progress Report due visit #10        Current Problem  Problem List Items Addressed This Visit    None  Visit Diagnoses         Codes      Spinal stenosis of lumbar region with neurogenic claudication     M48.062                 Precautions  Precautions  Precautions Comment: Hx of skin CA    Pain  0-10 (Numeric) Pain Score: 7 \"mainly hip\"    Subjective  General  Reason for Referral: Spinal stenosis with neurogenic claudication  Referred By: Tomasz Chowdhury  The pt returns to the clinic stating that he is anticipating a TK as soon as he returns from his vacation at the very end of May. The pt states that his hip is more sore today as a result of doing a lot of yard work recently.        Objective    Improved posture during shallow water ambulation with aqua belt donned    Aquatic Treatment:     All exercises performed with aqua belt donned    4.5' depth: fwd, lat, and retro walking x3 laps    7' depth with aqua belt  Decompression 2'  Bicycle 2'  Decompression 2'  Abd/Add 2'  Decompression 2'  XC ski 2'  Decompression 2'  SKTC 2'  Decompression 2'  Repeated sequence twice     Forward walking in 3.5 ft to acclimate to shallow water x5 mins (per pt request to work on gait pattern)     At steps:  Hamstring stretch 30\" x2    Current HEP:  HL LTRs       OP EDUCATION:       Assessment:  Utilized aqua belt to assist with off weighting during both shallow and deep water activity. The pt did not notice a significant " "difference with reduced pressure compared to the noodle. The pt was able to demonstrate more natural arm swing and UE posture when in the shallow water, but the pt preferred the noodle in the deep water, finding it easier to remain upright.     Areas of improvements:  AROM, Improved strength, and improved tolerance to gait by the end of the session  Limitations/deficits:  Weakness and Pain limits activity     Pain end of session: 3-4/10 \"mainly hip\"    Plan:  OP PT Plan  Treatment/Interventions: Aquatic therapy, Dry needling, Cryotherapy, Education/ Instruction, Electrical stimulation, Gait training, Manual therapy, Neuromuscular re-education, Therapeutic activities, Therapeutic exercises  PT Plan: Skilled PT  PT Frequency: 1 time per week  Duration: 15 total visits (eval + 9 pool and 4-5 land)  Onset Date: 01/27/25  Certification Period Start Date: 01/27/25  Certification Period End Date: 04/27/25  Number of Treatments Authorized: [place] cap  Rehab Potential: Fair  Plan of Care Agreement: Patient  Continue with current POC with the following changes continue with remaining scheduled sessions - either pool or land (whichever pt finds most tolerable based on current set back with L hip), then anticipate d/c  in anticipation of pts TK    Assessment of current progress against goals:  Pt improving but progress is slow; also increase in L hip symptoms recently    Goals:  Active       PT Problem       PT STG (Progressing)       Start:  01/27/25    Expected End:  03/13/25         - Pt will complete the HEP with <3 verbal cues for correction,   - Pt will demonstrate 2pt improvement on the NPRS, allowing for improved tolerance of functional activities.,   - Pt will demonstrate min losses in all lumbar AROM, without onset of pain, allowing for improved ability to perform functional activities.,   - Pt will demonstrate ability to perform 5 step ups to 8\" step leading with each LE and with 1 UE support in order to demonstrate " "improved ability to navigate stairs,   - Pt will demonstrate ability to ambulate 300' with least restrictive device and equal stance time bilat in order to begin demoing normalized gait pattern.,   - Pt will demonstrate at least 4+/5 MMT grading throughout LE musculature, allowing for appropriate muscle recruitment during daily activities. ,       Goal Note       Limited ability to assess steps and ambulation distances d/t pts heightened pain this date.               PT LTG (Progressing)       Start:  01/27/25    Expected End:  04/27/25       - Pt will be independent in HEP & symptom management,   - Pt will demonstrate 4pt improvement on the NPRS, allowing for improved tolerance of functional activities. ,   - Pt will demonstrate full ROM in all lumbar AROM, without onset of pain, allowing for the ability to perform functional activities.,   - Pt will demonstrate ability to ambulate at least 350' with least restrictive/no device without breaks or increases in pain in order to demonstrate improved tolerance to prolonged ambulation.,   - Pt will obtain and maintain a neutral posture throughout PT session in order to allow for proper muscle length-tension relationships and recruitment for daily tasks,   - Pt will demonstrate ability to perform 10 sit to stands without UE use in order to demonstrate improved functional LE strength and improved independence with functional mobility,   - Pt will demonstrate ability to perform 10 step ups to 8\" step leading with each LE and with 1 UE support in order to demonstrate improved ability to navigate stairs,   - Pt will demonstrate improved OSWESTRY score by 13 points (MCID) in order to demonstrate improved functional mobility.     Patient stated goals: \"pain relief in both legs, restore function in walking, standing, climbing stairs\"                      "

## 2025-04-29 ENCOUNTER — APPOINTMENT (OUTPATIENT)
Dept: ORTHOPEDIC SURGERY | Facility: CLINIC | Age: 70
End: 2025-04-29
Payer: MEDICARE

## 2025-04-29 NOTE — PROGRESS NOTES
"    Physical Therapy Treatment    Patient Name: Austyn Alfaro  MRN: [id number]  Encounter date:  4/30/2025  Time Calculation  Start Time: 1345  Stop Time: 1427  Time Calculation (min): 42 min     PT Therapeutic Procedures Time Entry  Aquatic Therapy Time Entry: 42    Visit Number:  12 (including evaluation)  Planned total visits: 15  Visit Authorized/insurance coverage:  Payor: MEDICARE / Plan: MEDICARE PART A AND B / Product Type: *No Product type* /  [place]IC[place] A/[place], [place],  [place], NO AUTH ( 0 USED PT/ST)   Progress Report due visit #10        Current Problem  Problem List Items Addressed This Visit    None  Visit Diagnoses         Codes      Spinal stenosis of lumbar region with neurogenic claudication     M48.062            Precautions  Precautions  Precautions Comment: Hx of skin CA    Pain  0-10 (Numeric) Pain Score: 6 (0/10 back pain, 6/10 hip pain)     Subjective  General  Reason for Referral: Spinal stenosis with neurogenic claudication  Referred By: Tomasz Chowdhury  The pt returns stating that his back is feeling good today, but his hip is painful when he is moving around.        Objective    Improved posture during shallow water ambulation with aqua belt donned    Aquatic Treatment:  4.5' depth: fwd, lat, and retro walking x3 laps    7' depth with aqua belt  Decompression 2'  Bicycle 2'  Decompression 2'  Abd/Add 2'  Decompression 2'  XC ski 2'  Decompression 2'  SKTC 2'  Decompression 2'  Repeated sequence twice     Forward and retro fast walking in 3.5 ft to work on gait pattern and endurance x8 mins      At steps:  Hamstring stretch 30\" x2    Current HEP:  HL LTRs       OP EDUCATION:       Assessment:  The pt was able to perform quick walking and some jogging steps in the shallow water this date without discomfort. The pt moved very easily in the water today, appearing very comfortable and confident. Anticipate 1 additional session and then discharge.     Areas of improvements:  AROM, Improved strength, " "and improved tolerance to gait by the end of the session  Limitations/deficits:  Weakness and Pain limits activity     Pain end of session: \"what pain\"    Plan:  OP PT Plan  Treatment/Interventions: Aquatic therapy, Dry needling, Cryotherapy, Education/ Instruction, Electrical stimulation, Gait training, Manual therapy, Neuromuscular re-education, Therapeutic activities, Therapeutic exercises  PT Plan: Skilled PT  PT Frequency: 1 time per week  Duration: 15 total visits (eval + 9 pool and 4-5 land)  Onset Date: 01/27/25  Certification Period Start Date: 01/27/25  Certification Period End Date: 04/27/25  Number of Treatments Authorized: [place] cap  Rehab Potential: Fair  Plan of Care Agreement: Patient  Continue with current POC with the following changes 1 additional session, then d/c    Assessment of current progress against goals:  Pt improving but progress is slow; also increase in L hip symptoms recently    Goals:  Active       PT Problem       PT STG (Progressing)       Start:  01/27/25    Expected End:  03/13/25         - Pt will complete the HEP with <3 verbal cues for correction,   - Pt will demonstrate 2pt improvement on the NPRS, allowing for improved tolerance of functional activities.,   - Pt will demonstrate min losses in all lumbar AROM, without onset of pain, allowing for improved ability to perform functional activities.,   - Pt will demonstrate ability to perform 5 step ups to 8\" step leading with each LE and with 1 UE support in order to demonstrate improved ability to navigate stairs,   - Pt will demonstrate ability to ambulate 300' with least restrictive device and equal stance time bilat in order to begin demoing normalized gait pattern.,   - Pt will demonstrate at least 4+/5 MMT grading throughout LE musculature, allowing for appropriate muscle recruitment during daily activities. ,       Goal Note       Limited ability to assess steps and ambulation distances d/t pts heightened pain this date. " "              PT LTG (Progressing)       Start:  01/27/25    Expected End:  04/27/25       - Pt will be independent in HEP & symptom management,   - Pt will demonstrate 4pt improvement on the NPRS, allowing for improved tolerance of functional activities. ,   - Pt will demonstrate full ROM in all lumbar AROM, without onset of pain, allowing for the ability to perform functional activities.,   - Pt will demonstrate ability to ambulate at least 350' with least restrictive/no device without breaks or increases in pain in order to demonstrate improved tolerance to prolonged ambulation.,   - Pt will obtain and maintain a neutral posture throughout PT session in order to allow for proper muscle length-tension relationships and recruitment for daily tasks,   - Pt will demonstrate ability to perform 10 sit to stands without UE use in order to demonstrate improved functional LE strength and improved independence with functional mobility,   - Pt will demonstrate ability to perform 10 step ups to 8\" step leading with each LE and with 1 UE support in order to demonstrate improved ability to navigate stairs,   - Pt will demonstrate improved OSWESTRY score by 13 points (MCID) in order to demonstrate improved functional mobility.     Patient stated goals: \"pain relief in both legs, restore function in walking, standing, climbing stairs\"                      " Detail Level: Zone Plan: History of melanoma in situ, diagnosed 12/14 and excised 3/15\\nRecheck again in 6 mo

## 2025-04-30 ENCOUNTER — TREATMENT (OUTPATIENT)
Dept: PHYSICAL THERAPY | Facility: CLINIC | Age: 70
End: 2025-04-30
Payer: MEDICARE

## 2025-04-30 DIAGNOSIS — M48.062 SPINAL STENOSIS OF LUMBAR REGION WITH NEUROGENIC CLAUDICATION: ICD-10-CM

## 2025-04-30 PROCEDURE — 97113 AQUATIC THERAPY/EXERCISES: CPT | Mod: GP

## 2025-04-30 ASSESSMENT — PAIN SCALES - GENERAL: PAINLEVEL_OUTOF10: 6

## 2025-05-05 DIAGNOSIS — T75.3XXA MOTION SICKNESS, INITIAL ENCOUNTER: ICD-10-CM

## 2025-05-05 DIAGNOSIS — M48.062 SPINAL STENOSIS OF LUMBAR REGION WITH NEUROGENIC CLAUDICATION: ICD-10-CM

## 2025-05-05 RX ORDER — SCOPOLAMINE 1 MG/3D
1 PATCH, EXTENDED RELEASE TRANSDERMAL
Qty: 10 PATCH | Refills: 0 | Status: SHIPPED | OUTPATIENT
Start: 2025-05-05 | End: 2025-07-04

## 2025-05-06 NOTE — PROGRESS NOTES
"    Physical Therapy Treatment & Discharge    Patient Name: Austyn Alfaro  MRN: [id number]  Encounter date:  5/7/2025  Time Calculation  Start Time: 1130  Stop Time: 1213  Time Calculation (min): 43 min     PT Therapeutic Procedures Time Entry  Aquatic Therapy Time Entry: 43    Visit Number:  13 (including evaluation)  Planned total visits: 15  Visit Authorized/insurance coverage:  Payor: MEDICARE / Plan: MEDICARE PART A AND B / Product Type: *No Product type* /  [place] A/[place], [place], [place] AUTH ( 0 USED PT/ST)   Progress Report due visit #10        Current Problem  Problem List Items Addressed This Visit    None  Visit Diagnoses         Codes      Spinal stenosis of lumbar region with neurogenic claudication     M48.062              Precautions  Precautions  Precautions Comment: Hx of skin CA    Pain  0-10 (Numeric) Pain Score:  (back pain = 3/10, hip pain = 7-8/10)     Subjective  General  Reason for Referral: Spinal stenosis with neurogenic claudication  Referred By: Tomasz Chowdhury  The pt returns with some back pain today, but most pain continues to be in his hip.        Objective    Decreased pain with deep water exercise    Aquatic Treatment:  4.5' depth: fwd, lat, and retro walking x3 laps    7' depth with noodle  Decompression 2'  Bicycle 2'  Decompression 2'  Abd/Add 2'  Decompression 2'  XC ski 2'  Decompression 2'  SKTC 2'  Decompression 2'  Repeated sequence twice     At steps:  Hamstring stretch 30\" x2    Current HEP:  HL LTRs       OP EDUCATION:       Assessment:  The pts low back symptoms have improved, however his hip symptoms have worsened and the pt is anticipating a TK in a few weeks. The pt is being discharged at this time.     Areas of improvements:  Back pain levels, activity level  Limitations/deficits:  Hip pain    Pain end of session: 1/10    Plan:  OP PT Plan  Treatment/Interventions: Aquatic therapy, Dry needling, Cryotherapy, Education/ Instruction, Electrical stimulation, Gait " "training, Manual therapy, Neuromuscular re-education, Therapeutic activities, Therapeutic exercises  PT Plan: Skilled PT  PT Frequency: 1 time per week  Duration: 15 total visits (eval + 9 pool and 4-5 land)  Onset Date: 01/27/25  Certification Period Start Date: 01/27/25  Certification Period End Date: 04/27/25  Number of Treatments Authorized: [place]/[facility] cap  Rehab Potential: Fair  Plan of Care Agreement: Patient  Discharge    Assessment of current progress against goals:  Pt improving but progress is slow; also increase in L hip symptoms    Goals:  Resolved       PT Problem       PT STG (Adequate for Discharge)       Start:  01/27/25    Expected End:  03/13/25         - Pt will complete the HEP with <3 verbal cues for correction,   - Pt will demonstrate 2pt improvement on the NPRS, allowing for improved tolerance of functional activities.,   - Pt will demonstrate min losses in all lumbar AROM, without onset of pain, allowing for improved ability to perform functional activities.,   - Pt will demonstrate ability to perform 5 step ups to 8\" step leading with each LE and with 1 UE support in order to demonstrate improved ability to navigate stairs,   - Pt will demonstrate ability to ambulate 300' with least restrictive device and equal stance time bilat in order to begin demoing normalized gait pattern.,   - Pt will demonstrate at least 4+/5 MMT grading throughout LE musculature, allowing for appropriate muscle recruitment during daily activities. ,          PT LTG (Adequate for Discharge)       Start:  01/27/25    Expected End:  04/27/25       - Pt will be independent in HEP & symptom management,   - Pt will demonstrate 4pt improvement on the NPRS, allowing for improved tolerance of functional activities. ,   - Pt will demonstrate full ROM in all lumbar AROM, without onset of pain, allowing for the ability to perform functional activities.,   - Pt will demonstrate ability to ambulate at least 350' with least " "restrictive/no device without breaks or increases in pain in order to demonstrate improved tolerance to prolonged ambulation.,   - Pt will obtain and maintain a neutral posture throughout PT session in order to allow for proper muscle length-tension relationships and recruitment for daily tasks,   - Pt will demonstrate ability to perform 10 sit to stands without UE use in order to demonstrate improved functional LE strength and improved independence with functional mobility,   - Pt will demonstrate ability to perform 10 step ups to 8\" step leading with each LE and with 1 UE support in order to demonstrate improved ability to navigate stairs,   - Pt will demonstrate improved OSWESTRY score by 13 points (MCID) in order to demonstrate improved functional mobility.     Patient stated goals: \"pain relief in both legs, restore function in walking, standing, climbing stairs\"                      "

## 2025-05-07 ENCOUNTER — TREATMENT (OUTPATIENT)
Dept: PHYSICAL THERAPY | Facility: CLINIC | Age: 70
End: 2025-05-07
Payer: MEDICARE

## 2025-05-07 DIAGNOSIS — M48.062 SPINAL STENOSIS OF LUMBAR REGION WITH NEUROGENIC CLAUDICATION: ICD-10-CM

## 2025-05-07 PROCEDURE — 97113 AQUATIC THERAPY/EXERCISES: CPT | Mod: GP

## 2025-05-07 RX ORDER — PREDNISONE 20 MG/1
TABLET ORAL
Qty: 30 TABLET | Refills: 0 | OUTPATIENT
Start: 2025-05-07 | End: 2025-05-22

## 2025-05-07 NOTE — TELEPHONE ENCOUNTER
Patient contacted, he is aware and was not sure why his pharmacy sent the request to Dr. Chowdhury

## 2025-06-13 DIAGNOSIS — I48.19 ATRIAL FIBRILLATION, PERSISTENT (MULTI): Primary | ICD-10-CM

## 2025-06-13 RX ORDER — FLECAINIDE ACETATE 150 MG/1
300 TABLET ORAL ONCE AS NEEDED
Qty: 10 TABLET | Refills: 3 | Status: SHIPPED | OUTPATIENT
Start: 2025-06-13 | End: 2026-06-13

## 2025-07-01 NOTE — PROGRESS NOTES
Cardiac Electrophysiology Office Visit   I had the pleasure seeing Austyn Alfaro    Current Outpatient Medications   Medication Instructions    apixaban (ELIQUIS) 5 mg, oral, 2 times daily    flecainide (TAMBOCOR) 300 mg, oral, Once as needed    ketoconazole (NIZOral) 2 % cream APPLY ONE APPLICATION TWICE DAILY TO CORNERS OF MOUTH AS NEEDED .    pregabalin (Lyrica) 150 mg capsule 1 capsule, Every 12 hours scheduled (0630,1830)    tamsulosin (FLOMAX) 0.4 mg, Every evening      Subjective    Austyn Alfaro is a 69 y.o. male .        Chief Complaint   Patient presents with    Atrial Fibrillation     OUTPATIENT CONSULTATION: Cardiac Electrophysiology  DOS: July 09, 2025    REASON: Atrial Fibrillation - Evaluation and Treatment Last seen by me 02/2020  REFERRING: Self    HPI     Austyn Alfaro has a past medical history of Squamous Cell Carcinoma of skin, DVT, Hypercholesterolemia  CARDIAC HISTORY:  CAD  AF - Index dx 01/2010. This was documented with an ECG. He was scheduled to be cardioverted but reverted back into NSR by the following day. He had previously been managed with Flecainide pill in pocket. I saw him 02/2020 as his episodes had become more frequent and he was very symptomatic. He is now s/p PVI+CTI Line 03/16/2020, He went back in to AF while vaccuming a rug. This is two weeks after the knee surgery (May 29, 2025). We prescribed pill in a pocket Flecainide and it worked. Since he had few episodes of AF but each one is getting shorter. He stated his last episode of AF prior to that was May 2023.      [id number] Score  Age 65-74: 1   Sex Male: 0   CHF History No: 0   HTN No: 0   Stroke/TIA/Thromboembolism Yes: 2   Vascular Dz: CAD/PAD/Aortic Plaque No: 0   DM No: 0   Total Score 3     RELEVANT TESTING:     No results found for this or any previous visit from the past 1095 days.       Lab Review:   Lab Results   Component Value Date    WBC 6.5 09/19/2024    HGB 15.2 09/19/2024    HCT 46.5 09/19/2024      09/19/2024    CHOL 223 (H) 09/19/2024    TRIG 84 09/19/2024    HDL 47.0 09/19/2024    ALT 20 09/19/2024    AST 19 09/19/2024     09/19/2024    K 4.6 09/19/2024     09/19/2024    CREATININE 0.87 09/19/2024    BUN 19 09/19/2024    CO2 28 09/19/2024    TSH 1.07 09/19/2024    INR 1.2 (H) 02/18/2020    HGBA1C 5.7 (H) 09/19/2024       Review of Systems   All other systems reviewed and are negative.       Objective    Physical Exam       Assessment/Plan     AF - he responded very well in 2020 to the PVI. He recently had several episodes which I feel are secondary to the recent surgery. We will wait at least three months to see if AF is acting up. For now we will use Flecainide pill in a pocket.

## 2025-07-02 ENCOUNTER — APPOINTMENT (OUTPATIENT)
Dept: PHYSICAL THERAPY | Facility: CLINIC | Age: 70
End: 2025-07-02
Payer: MEDICARE

## 2025-07-08 ENCOUNTER — HOSPITAL ENCOUNTER (OUTPATIENT)
Dept: RADIOLOGY | Facility: HOSPITAL | Age: 70
Discharge: HOME | End: 2025-07-08
Payer: MEDICARE

## 2025-07-08 ENCOUNTER — OFFICE VISIT (OUTPATIENT)
Dept: ORTHOPEDIC SURGERY | Facility: CLINIC | Age: 70
End: 2025-07-08
Payer: MEDICARE

## 2025-07-08 DIAGNOSIS — M25.531 RIGHT WRIST PAIN: ICD-10-CM

## 2025-07-08 DIAGNOSIS — M19.031: Primary | ICD-10-CM

## 2025-07-08 PROCEDURE — 99213 OFFICE O/P EST LOW 20 MIN: CPT

## 2025-07-08 PROCEDURE — 1160F RVW MEDS BY RX/DR IN RCRD: CPT

## 2025-07-08 PROCEDURE — 73110 X-RAY EXAM OF WRIST: CPT | Mod: RIGHT SIDE | Performed by: RADIOLOGY

## 2025-07-08 PROCEDURE — 1125F AMNT PAIN NOTED PAIN PRSNT: CPT

## 2025-07-08 PROCEDURE — 1036F TOBACCO NON-USER: CPT

## 2025-07-08 PROCEDURE — 73110 X-RAY EXAM OF WRIST: CPT | Mod: RT

## 2025-07-08 PROCEDURE — 20605 DRAIN/INJ JOINT/BURSA W/O US: CPT

## 2025-07-08 PROCEDURE — 1159F MED LIST DOCD IN RCRD: CPT

## 2025-07-08 RX ORDER — LIDOCAINE HYDROCHLORIDE 10 MG/ML
1 INJECTION, SOLUTION INFILTRATION; PERINEURAL
Status: COMPLETED | OUTPATIENT
Start: 2025-07-08 | End: 2025-07-08

## 2025-07-08 RX ORDER — TRIAMCINOLONE ACETONIDE 40 MG/ML
40 INJECTION, SUSPENSION INTRA-ARTICULAR; INTRAMUSCULAR
Status: COMPLETED | OUTPATIENT
Start: 2025-07-08 | End: 2025-07-08

## 2025-07-08 RX ADMIN — TRIAMCINOLONE ACETONIDE 40 MG: 40 INJECTION, SUSPENSION INTRA-ARTICULAR; INTRAMUSCULAR at 12:48

## 2025-07-08 RX ADMIN — LIDOCAINE HYDROCHLORIDE 1 ML: 10 INJECTION, SOLUTION INFILTRATION; PERINEURAL at 12:48

## 2025-07-08 ASSESSMENT — PAIN SCALES - GENERAL: PAINLEVEL_OUTOF10: 4

## 2025-07-08 ASSESSMENT — PAIN - FUNCTIONAL ASSESSMENT: PAIN_FUNCTIONAL_ASSESSMENT: 0-10

## 2025-07-08 NOTE — PROGRESS NOTES
HPI  Austyn Alfaro is a 69 y.o. male  in office today for   Chief Complaint   Patient presents with    Right Wrist - Pain   .  he states he has had wrist pain for about 8 weeks.  He had left total hip 6 weeks ago, he states that prior to that he went on a vacation and relief heavily on a cane on the right side to walk and hike during his vacation.  He continues to have pain radial side of wrist joint though has slightly improved. He has been icing, can't take NSAIDs as he is on eliquis.  This is dominant hand.    Past Medical History: left TK recently    Medication  Medications Ordered Prior to Encounter[1]    Physical Exam  Constitutional: well developed, well nourished male in no acute distress  Psychiatric: normal mood, appropriate affect  Eyes: sclera anicteric  HENT: normocephalic/atraumatic  CV: regular rate and rhythm   Respiratory: non labored breathing  Integumentary: no rash  Neurological: moves all extremities    Right Hand Exam     Tenderness   The patient is experiencing tenderness in the radial area.    Range of Motion   Wrist   Extension:  30   Flexion:  40   Pronation:  normal   Supination:  normal     Muscle Strength   : 4/5     Other   Erythema: absent  Scars: absent    Comments:  No laxity or instability. Mild edema radial side of wrist.  -CMC grind        Patient ID: Austyn Alfaro is a 69 y.o. male.    M Inj/Asp: R radiocarpal on 7/8/2025 12:48 PM  Indications: pain  Details: 25 G needle, dorsal approach  Medications: 40 mg triamcinolone acetonide 40 mg/mL; 1 mL lidocaine 10 mg/mL (1 %)  Outcome: tolerated well, no immediate complications  Procedure, treatment alternatives, risks and benefits explained, specific risks discussed. Consent was given by the patient. Immediately prior to procedure a time out was called to verify the correct patient, procedure, equipment, support staff and site/side marked as required. Patient was prepped and draped in the usual sterile fashion.              Imaging/Lab:  X-rays were taken today which were reviewed by myself and read by myself and show advanced degenerative changes about the wrist and hand particularly at the first CMC and radial carpal joint with joint space narrowing and spurring.      Assessment  Assessment: right wrist pain, right wrist arthritis    Plan  Plan:  History, physical exam, and imaging were reviewed with patient. Discussed options for wrist arthritis including RICE, topical Voltaren, injections, bracing.  He states he already has a brace and can try that.  Would also like to try a steroid injection at this time. The right wrist was injected per procedure note above.  Follow Up: Patient to follow up as needed if pain persists or gets worse.      All questions were answered for the patient prior to end of exam and patient addressed their understanding.    Kelly Wiseman PA-C  25         [1]   Current Outpatient Medications on File Prior to Visit   Medication Sig Dispense Refill    apixaban (Eliquis) 5 mg tablet Take 1 tablet (5 mg) by mouth 2 times a day. 180 tablet 3    flecainide (Tambocor) 150 mg tablet Take 2 tablets (300 mg) by mouth 1 time if needed (for onset of AF.  DO NOT EXCEED 300mg in 24 hrs.). 10 tablet 3    ketoconazole (NIZOral) 2 % cream APPLY ONE APPLICATION TWICE DAILY TO CORNERS OF MOUTH AS NEEDED .      pregabalin (Lyrica) 150 mg capsule Take 1 capsule (150 mg) by mouth every 12 hours.      [] scopolamine (Transderm-Scop) 1 mg over 3 days patch 3 day PLACE 1 PATCH OVER 72 HOURS ON THE SKIN EVERY 3RD DAY IF NEEDED (NAUSEA). 10 patch 0    tamsulosin (Flomax) 0.4 mg 24 hr capsule Take 1 capsule (0.4 mg) by mouth once daily in the evening. (Patient taking differently: Take 2 capsules (0.8 mg) by mouth once daily in the evening.)       No current facility-administered medications on file prior to visit.

## 2025-07-08 NOTE — PROGRESS NOTES
Physical Therapy Evaluation     Patient Name: Ausytn Alfaro  MRN: [id number]  Today's Date: 7/9/2025  Time Calculation  Start Time: 1130  Stop Time: 1210  Time Calculation (min): 40 min  PT Evaluation Time Entry  PT Evaluation (Low) Time Entry: 35     PT Therapeutic Procedures Time Entry  Therapeutic Exercise Time Entry: 5    Insurance Considerations:  06/30/2025: NO AUTH MEDICARE A/B, [place],  [place],( 1172.23 USED PT/ST)     Problem List Items Addressed This Visit    None  Visit Diagnoses         Codes      Left hip pain    -  Primary M25.552    Relevant Orders    Follow Up In Physical Therapy      Unilateral primary osteoarthritis, left hip     M16.12    Relevant Orders    Follow Up In Physical Therapy      Other abnormalities of gait and mobility     R26.89    Relevant Orders    Follow Up In Physical Therapy            Relevant Imaging:  Imaging  No results found.    Cardiology, Vascular, and Other Imaging  ECG 12 lead (Clinic Performed)  Result Date: 7/9/2025  NSR, rightward axis, LVH      Surgery:   L TK (posterior) 5/29/25    Subjective  /General:  General  Reason for Referral: L hip OA  Referred By: Kelly Zambrano  Patient reported hx of current condition: The pt presents s/p L TK on 5/29/25 and was discharged home with home PT. The pt has completed home PT. The pt has primary goals of PT to improve his gait and improve strength and mobility and return to athletics.     The pt was cleared for aquatics and has returned to the pool himself and has done exercises on his own. He also reports that his posterior precautions are in effect for 90 days after the surgery.    Pt is walking a lot and is able to do his stairs step over step without a hand rail    Aggravating factors: Nothing  Relieving factors: already pain free     Precautions/ Red Flags:  Precautions  Post-Surgical Precautions: Left hip precautions (Posterior precautions -- No marching per order)  Precautions Comment: Hx of DVT  Hx of multiple  "DVTs     Per op note:   \"POSTERIOR HIP PRECAUTIONS  ** There are four main precautions:  (1) Avoid bending your hip more than 90 degrees. Refrain from sitting in deep chairs or couches that cause increased bending at the hip.  (2) Do not cross your legs.  (3) Avoid excessive rotation of your leg, either in or out.  (4) No flexion, internal rotation, adduction maneuvers. This is information for your physical therapist while working on exercises.     WEIGHT BEARING STATUS: Weight Bearing As Tolerated with posterior hip precautions\"    Red flags   Hx of CA Yes - skin CA that was removed   Pacemaker/ Electronic Implant No   Saddle Anesthesia No   Bowel/Bladder Changes No   Sudden Weakness No   Recent Falls (within last 6mo) No     Relevant PMH:  R hip replacement 2014  Cardiac cath ablation 2020  L peroneal tendon tear about a yr ago  DVT (on elloquis)    Pain:  Pain Assessment  Pain Assessment: 0-10  0-10 (Numeric) Pain Score: 0 - No pain  Home Living:      Lives with: Spouse  Home type: House  Stairs: Yes - with handrail  Occupation: retired  Hobbies/activities: golfing, tennis, pickleball.  Prior Function Per Pt/Caregiver Report:  Prior Function Per Pt/Caregiver Report  Prior Function Comments: Pt previously played golf, tennis,  and pickle ball    Objective   Posture:  Posture Comment: pt stands with R trunk lean and weight shifted off L LE  Gait:  Gait Comment: The pt ambulates into the clinic indep without AD. Pt ambulates with R sided trunk lean, and decreased stance time on the L LE  Transfers:  Transfers Comment: Pt utilizes UE and extends L LE out in front of him (pt is tall and is unable to sit in a standard height chair without doing so in order to maintain  his 90deg precuations)  Other:     Hip AROM L R   Flexion deferred ~115 deg   External Rotation deferred ~40 deg   Internal Rotation deferred ~20 deg      Hip MMT L R   Hip Flexion deferred 5/5   External Rotation deferred 4+/5   Internal Rotation " "deferred 4+/5      Knee MMT L R   Knee Flexion 5/5 5/5   Knee Extension 5/5 5/5      Ankle MMT L R   Dorsiflexion 5/5 5/5   Plantarflexion 5/5 5/5      Outcome Measures:  Other Measures  5x Sit to Stand: 12.09 (from 21\" surface (to protect surgical precautions))  Other Outcome Measures: WOMAC 13     Assessment:  PT Assessment Results: Decreased strength, Decreased range of motion, Decreased mobility, Orthopedic restrictions  Rehab Prognosis: Excellent    Pt is a 69 y.o. male who presents s/p L TK 5/29/25. The current impairments have led to functional limitations that include: walking and returning to sport. Pt would benefit from skilled physical therapy intervention to improve impairments and facilitate return to prior function.    Complexity of Evaluation: Low    Based on the history including personal factors and/or comorbidities, examination of body systems including body structures and function, activity limitations, and/or participation restrictions, as well as clinical presentation, patient meets criteria for a Low complexity evaluation.    Plan:  Treatment/Interventions: Aquatic therapy, Education/ Instruction, Gait training, Manual therapy, Neuromuscular re-education, Therapeutic activities, Therapeutic exercises  PT Plan: Skilled PT  PT Frequency: 1 time per week (to every other week)  Duration: 10 visits  Onset Date: 07/09/25  Certification Period Start Date: 07/09/25  Certification Period End Date: 10/07/25  Number of Treatments Authorized: KAREN cap/MN  Rehab Potential: Excellent  Plan of Care Agreement: Patient    Plan for next visit: LE strengthening (within precautions), balance, and gait training    OP EDUCATION:  Outpatient Education  Individual(s) Educated: Patient  Education Provided: Anatomy, Home Exercise Program, POC, Post-Op Precautions  Risk and Benefits Discussed with Patient/Caregiver/Other: yes  Patient/Caregiver Demonstrated Understanding: yes  Plan of Care Discussed and Agreed Upon: " "yes  Patient Response to Education: Patient/Caregiver Verbalized Understanding of Information, Patient/Caregiver Performed Return Demonstration of Exercises/Activities, Patient/Caregiver Asked Appropriate Questions    Today's Treatment:  Therapeutic Exercise  HEP to be completed daily, exercises include:  Access Code: [email or web address]  URL: [email or web address]  Date: 07/09/2025  Prepared by: Rocio Dunlap    Exercises  - Supine Heel Slide  - 2 x daily - 7 x weekly - 3 sets - 10 reps - 3-5\" hold  - Active Straight Leg Raise with Quad Set  - 2 x daily - 7 x weekly - 3 sets - 10 reps  - Sit to Stand with Counter Support  - 2 x daily - 7 x weekly - 3 sets - 10 reps (from heightened chair)  - Standing Hip Abduction with Counter Support  - 2 x daily - 7 x weekly - 3 sets - 10 reps  - Standing Hip Extension with Counter Support  - 2 x daily - 7 x weekly - 3 sets - 10 reps  - Standing Hip Flexion with Counter Support  - 2 x daily - 7 x weekly - 3 sets - 10 reps  - Standing Knee Flexion AROM with Chair Support  - 2 x daily - 7 x weekly - 2-3 sets - 10 reps  - Mini Squat with Counter Support  - 2 x daily - 7 x weekly - 3 sets - 10 reps  - Heel Raises with Counter Support  - 2 x daily - 7 x weekly - 3 sets - 10 reps  - Toe Raises with Counter Support  - 2 x daily - 7 x weekly - 3 sets - 10 reps    Pt familiar with most exercises from PT prior to surgery - verbally reviewed all.  Pt demoed:  Minisquats - cues for avoidance of flexion >90  Sit to stand  QS+ SLR    Goals:  Active       PT Problem       PT STG       Start:  07/09/25    Expected End:  08/23/25       - Pt will complete the HEP with <3 verbal cues for correction,   - Pt will demonstrate 2pt improvement on the NPRS, allowing for improved tolerance of functional activities.,   - Pt will demonstrate at least 4/5 MMT grading throughout LE musculature, allowing for appropriate muscle recruitment during daily activities. ,   - Pt will demonstrate ability to perform 5 " "step ups to 8\" step leading with each LE and with 1 UE support in order to demonstrate improved ability to navigate stairs,          PT LTG       Start:  07/09/25    Expected End:  10/07/25       - Pt will be independent in HEP & symptom management,   - Pt will demonstrate a 4 pt improvement for hip pain on the NPRS, allowing for improved tolerance to perform daily functional activities. ,   - Pt will demonstrate 5/5 MMT grading throughout hip/knee musculature, allowing for appropriate muscle recruitment during daily activities. ,   - Pt will demonstrate ability to perform 10 step ups to 8\" step leading with each LE and with 1 UE support in order to demonstrate improved ability to navigate stairs,   - Pt will demonstrate ability to ambulate at least 350' with least restrictive/no device without breaks or increases in pain in order to demonstrate improved tolerance to prolonged ambulation.,   - Pt will perform tandem stance >30\" with EO leading with ea LE in order to demonstrate improved proprioception and stability for improved safety with navigation around obstacles.,   - Pt will demonstrate improved WOMAC score by 11 points (MCID) in order to demonstrate improved functional mobility. ,                                "

## 2025-07-09 ENCOUNTER — APPOINTMENT (OUTPATIENT)
Dept: CARDIOLOGY | Facility: CLINIC | Age: 70
End: 2025-07-09
Payer: MEDICARE

## 2025-07-09 ENCOUNTER — EVALUATION (OUTPATIENT)
Dept: PHYSICAL THERAPY | Facility: CLINIC | Age: 70
End: 2025-07-09
Payer: MEDICARE

## 2025-07-09 VITALS — SYSTOLIC BLOOD PRESSURE: 159 MMHG | RESPIRATION RATE: 16 BRPM | HEART RATE: 70 BPM | DIASTOLIC BLOOD PRESSURE: 88 MMHG

## 2025-07-09 DIAGNOSIS — I25.10 CORONARY ARTERY CALCIFICATION: ICD-10-CM

## 2025-07-09 DIAGNOSIS — R26.89 OTHER ABNORMALITIES OF GAIT AND MOBILITY: ICD-10-CM

## 2025-07-09 DIAGNOSIS — M25.552 LEFT HIP PAIN: Primary | ICD-10-CM

## 2025-07-09 DIAGNOSIS — I48.91 ATRIAL FIBRILLATION, UNSPECIFIED TYPE (MULTI): Primary | ICD-10-CM

## 2025-07-09 DIAGNOSIS — M16.12 UNILATERAL PRIMARY OSTEOARTHRITIS, LEFT HIP: ICD-10-CM

## 2025-07-09 PROCEDURE — 1159F MED LIST DOCD IN RCRD: CPT | Performed by: INTERNAL MEDICINE

## 2025-07-09 PROCEDURE — 93000 ELECTROCARDIOGRAM COMPLETE: CPT | Performed by: INTERNAL MEDICINE

## 2025-07-09 PROCEDURE — G2211 COMPLEX E/M VISIT ADD ON: HCPCS | Performed by: INTERNAL MEDICINE

## 2025-07-09 PROCEDURE — 1126F AMNT PAIN NOTED NONE PRSNT: CPT | Performed by: INTERNAL MEDICINE

## 2025-07-09 PROCEDURE — 1036F TOBACCO NON-USER: CPT | Performed by: INTERNAL MEDICINE

## 2025-07-09 PROCEDURE — 99214 OFFICE O/P EST MOD 30 MIN: CPT | Performed by: INTERNAL MEDICINE

## 2025-07-09 PROCEDURE — 97161 PT EVAL LOW COMPLEX 20 MIN: CPT | Mod: GP

## 2025-07-09 PROCEDURE — 1160F RVW MEDS BY RX/DR IN RCRD: CPT | Performed by: INTERNAL MEDICINE

## 2025-07-09 ASSESSMENT — ENCOUNTER SYMPTOMS
DEPRESSION: 0
LOSS OF SENSATION IN FEET: 0
OCCASIONAL FEELINGS OF UNSTEADINESS: 0

## 2025-07-09 ASSESSMENT — COLUMBIA-SUICIDE SEVERITY RATING SCALE - C-SSRS
1. IN THE PAST MONTH, HAVE YOU WISHED YOU WERE DEAD OR WISHED YOU COULD GO TO SLEEP AND NOT WAKE UP?: NO
2. HAVE YOU ACTUALLY HAD ANY THOUGHTS OF KILLING YOURSELF?: NO
6. HAVE YOU EVER DONE ANYTHING, STARTED TO DO ANYTHING, OR PREPARED TO DO ANYTHING TO END YOUR LIFE?: NO

## 2025-07-09 ASSESSMENT — PAIN SCALES - GENERAL
PAINLEVEL_OUTOF10: 0 - NO PAIN
PAINLEVEL_OUTOF10: 0-NO PAIN
PAINLEVEL_OUTOF10: 0 - NO PAIN

## 2025-07-09 ASSESSMENT — PATIENT HEALTH QUESTIONNAIRE - PHQ9
1. LITTLE INTEREST OR PLEASURE IN DOING THINGS: NOT AT ALL
2. FEELING DOWN, DEPRESSED OR HOPELESS: NOT AT ALL
SUM OF ALL RESPONSES TO PHQ9 QUESTIONS 1 AND 2: 0

## 2025-07-09 ASSESSMENT — PAIN - FUNCTIONAL ASSESSMENT: PAIN_FUNCTIONAL_ASSESSMENT: 0-10

## 2025-07-25 ENCOUNTER — TREATMENT (OUTPATIENT)
Dept: PHYSICAL THERAPY | Facility: CLINIC | Age: 70
End: 2025-07-25
Payer: MEDICARE

## 2025-07-25 DIAGNOSIS — M25.552 LEFT HIP PAIN: ICD-10-CM

## 2025-07-25 DIAGNOSIS — M16.12 UNILATERAL PRIMARY OSTEOARTHRITIS, LEFT HIP: ICD-10-CM

## 2025-07-25 DIAGNOSIS — R26.89 OTHER ABNORMALITIES OF GAIT AND MOBILITY: ICD-10-CM

## 2025-07-25 PROCEDURE — 97110 THERAPEUTIC EXERCISES: CPT | Mod: GP

## 2025-07-25 PROCEDURE — 97116 GAIT TRAINING THERAPY: CPT | Mod: GP

## 2025-07-25 ASSESSMENT — PAIN SCALES - GENERAL: PAINLEVEL_OUTOF10: 0 - NO PAIN

## 2025-07-25 NOTE — PROGRESS NOTES
"    Physical Therapy Treatment    Patient Name: Austyn Alfaro  MRN: [id number]  Today's Date: 7/25/2025  Time Calculation  Start Time: 1301  Stop Time: 1341  Time Calculation (min): 40 min     PT Therapeutic Procedures Time Entry  Therapeutic Exercise Time Entry: 20  Gait Training Time Entry: 20    Visit Number:  2 (including evaluation)  Planned total visits: 10  Visit Authorized/ Insurance Considerations:  06/30/2025: NO AUTH MEDICARE A/B, [place],  [place],( 1172.23 USED PT/ST)     Current Problem  Problem List Items Addressed This Visit    None  Visit Diagnoses         Codes      Unilateral primary osteoarthritis, left hip     M16.12      Left hip pain     M25.552      Other abnormalities of gait and mobility     R26.89            Surgery  L TK 5/29/25      Precautions  Precautions  Post-Surgical Precautions: Left hip precautions (Posterior precautions -- No marching per order)  Precautions Comment: Hx of DVT    Pain  0-10 (Numeric) Pain Score: 0 - No pain    Subjective/General:  Reason for Referral: L hip OA  Referred By: Kelly Zambrano     The pt returns to the clinic stating that he has not had a chance to do the exercises because he was travelling for weddings.     Objective  Pt ambulates with severe L ankle supination, L knee valgus, and lack of trunk rotation and L hip extension     Treatments:  Ther-ex:  Recumebnt bike L2 x8mins  Fwd and Lat Step ups to 3\" step x10 ea side  Moflex gastroc stretch 30\" x2 ea side  Minisquats x15 - utilized mirror and chair behind for visual cues to maintain equal wbing - max time for form corrections    Gait:  Fwd walking with cues for toe off on L LE 30' x 4  Fwd/retro weight shifting x20 ea way  Tandem walk  30' x4   Fwd walking with cues for toe off on L LE 30' x 4    Current HEP:  HEP to be completed daily, exercises include:  Access Code: [email or web address]  URL: [email or web address]  Date: 07/09/2025  Prepared by: Rocio Dunlap     Exercises  - Supine Heel Slide  - 2 x " "daily - 7 x weekly - 3 sets - 10 reps - 3-5\" hold  - Active Straight Leg Raise with Quad Set  - 2 x daily - 7 x weekly - 3 sets - 10 reps  - Sit to Stand with Counter Support  - 2 x daily - 7 x weekly - 3 sets - 10 reps (from heightened chair)  - Standing Hip Abduction with Counter Support  - 2 x daily - 7 x weekly - 3 sets - 10 reps  - Standing Hip Extension with Counter Support  - 2 x daily - 7 x weekly - 3 sets - 10 reps  - Standing Hip Flexion with Counter Support  - 2 x daily - 7 x weekly - 3 sets - 10 reps  - Standing Knee Flexion AROM with Chair Support  - 2 x daily - 7 x weekly - 2-3 sets - 10 reps  - Mini Squat with Counter Support  - 2 x daily - 7 x weekly - 3 sets - 10 reps  - Heel Raises with Counter Support  - 2 x daily - 7 x weekly - 3 sets - 10 reps  - Toe Raises with Counter Support  - 2 x daily - 7 x weekly - 3 sets - 10 reps    OP Education:   Max education about use of ankle brace and supportive footwear  Advised pt to bring ankle brace next session and to utilize it while performing his exercises in order to allow for improved LE mechanics while recovering from TK.     Assessment:     Pt's response to treatment:  The pts gait deviations appeared to improve slightly throughout the session, but the pt will likely struggle to correct his gait to its' max potential without use of his ankle brace.   Areas of improvements:  pain levels  Limitations/deficits:  gait deviations    Pain end of session:  0/10    Plan:  OP PT Plan  Treatment/Interventions: Aquatic therapy, Education/ Instruction, Gait training, Manual therapy, Neuromuscular re-education, Therapeutic activities, Therapeutic exercises  PT Plan: Skilled PT  PT Frequency: 1 time per week (to every other week)  Duration: 10 visits  Onset Date: 07/09/25  Certification Period Start Date: 07/09/25  Certification Period End Date: 10/07/25  Number of Treatments Authorized: MC cap/MN  Rehab Potential: Excellent  Plan of Care Agreement: " "Patient  Continue with current POC/no changes  Next session: work on gait with ankle brace donned - provide pt with additional HEP to improve deviations (increase lateral hip/glute strengthening). Providing progressions of exercises is advised since the pt anticipates a lapse in PT apts d/t travelling after next session.     Assessment of current progress against goals:  Insufficient treatment time to assess progress  Goals:  Active       PT Problem       PT STG       Start:  07/09/25    Expected End:  08/23/25       - Pt will complete the HEP with <3 verbal cues for correction,   - Pt will demonstrate 2pt improvement on the NPRS, allowing for improved tolerance of functional activities.,   - Pt will demonstrate at least 4/5 MMT grading throughout LE musculature, allowing for appropriate muscle recruitment during daily activities. ,   - Pt will demonstrate ability to perform 5 step ups to 8\" step leading with each LE and with 1 UE support in order to demonstrate improved ability to navigate stairs,          PT LTG       Start:  07/09/25    Expected End:  10/07/25       - Pt will be independent in HEP & symptom management,   - Pt will demonstrate a 4 pt improvement for hip pain on the NPRS, allowing for improved tolerance to perform daily functional activities. ,   - Pt will demonstrate 5/5 MMT grading throughout hip/knee musculature, allowing for appropriate muscle recruitment during daily activities. ,   - Pt will demonstrate ability to perform 10 step ups to 8\" step leading with each LE and with 1 UE support in order to demonstrate improved ability to navigate stairs,   - Pt will demonstrate ability to ambulate at least 350' with least restrictive/no device without breaks or increases in pain in order to demonstrate improved tolerance to prolonged ambulation.,   - Pt will perform tandem stance >30\" with EO leading with ea LE in order to demonstrate improved proprioception and stability for improved safety with " navigation around obstacles.,   - Pt will demonstrate improved WOMAC score by 11 points (MCID) in order to demonstrate improved functional mobility. ,

## 2025-07-30 NOTE — PROGRESS NOTES
"    Physical Therapy Treatment    Patient Name: Austyn Alfaro  MRN: [id number]  Today's Date: 7/31/2025  Time Calculation  Start Time: 1100  Stop Time: 1145  Time Calculation (min): 45 min     PT Therapeutic Procedures Time Entry  Therapeutic Exercise Time Entry: 31  Gait Training Time Entry: 14    Visit Number:  3 (including evaluation)  Planned total visits: 10  Visit Authorized/ Insurance Considerations:  06/30/2025: NO AUTH MEDICARE A/B, [place],  [place],( 1172.23 USED PT/ST)     Current Problem  Problem List Items Addressed This Visit    None  Visit Diagnoses         Codes      Unilateral primary osteoarthritis, left hip     M16.12      Left hip pain     M25.552      Other abnormalities of gait and mobility     R26.89              Surgery  L TK 5/29/25 (posterior approach)      Precautions  Precautions  Post-Surgical Precautions: Left hip precautions (Posterior precautions -- No marching per order)  Precautions Comment: Hx of DVTR hip replacement 2014  Cardiac cath ablation 2020  L peroneal tendon tear about a yr ago  DVT (on elloquis)    Pain  0-10 (Numeric) Pain Score: 0 - No pain    Subjective/General:   Pt reports doing real well ; no pain.   Pt reports feeling some residual challenge with balance, denies any falls.  Pt reports his walking does feel better when wearing L ankle braced         Objective  Pt arrives ambulating without assistive device; L ankle brace donned and very mild L ankle supination, minimal L knee valgus .      Treatments:  Ther-ex:  Recumebnt bike L2 x 6 mins  Moflex gastroc stretch 30\" x2 ea side  TBSS, orange, x 3 laps countertop by // bars  Fwd and Lat Step ups to 5\" step 2  x10 ea side    Gait:  -tandem walking fwd x 3 reps  -4 yellow hurdles: step to; reciprocal; lateral x 3 reps each  -SLS R and L, floor,  3 x 20\" each   Current HEP:  HEP to be completed daily, exercises include:  Access Code: [email or web address]  URL: [email or web address]  Date: 07/09/2025  Prepared by: Rocio " "Anspach     Exercises  - Supine Heel Slide  - 2 x daily - 7 x weekly - 3 sets - 10 reps - 3-5\" hold  - Active Straight Leg Raise with Quad Set  - 2 x daily - 7 x weekly - 3 sets - 10 reps  - Sit to Stand with Counter Support  - 2 x daily - 7 x weekly - 3 sets - 10 reps (from heightened chair)  - Standing Hip Abduction with Counter Support  - 2 x daily - 7 x weekly - 3 sets - 10 reps  - Standing Hip Extension with Counter Support  - 2 x daily - 7 x weekly - 3 sets - 10 reps  - Standing Hip Flexion with Counter Support  - 2 x daily - 7 x weekly - 3 sets - 10 reps  - Standing Knee Flexion AROM with Chair Support  - 2 x daily - 7 x weekly - 2-3 sets - 10 reps  - Mini Squat with Counter Support  - 2 x daily - 7 x weekly - 3 sets - 10 reps  - Heel Raises with Counter Support  - 2 x daily - 7 x weekly - 3 sets - 10 reps  - Toe Raises with Counter Support  - 2 x daily - 7 x weekly - 3 sets - 10 reps    OP Education:   Updated HEP    Assessment:     Patient tolerated treatment well. Patient appears to be working hard in clinic and motivated to decrease pain and restore all functional deficits. Verbal and/or tactile cues given for proper form, and avoidance of substitution patterns with all exercises. All infection control policies followed during this visit. No observed antalgia with exercise performance, though pt demonstrates challenge with dynamic and static  balance.   No Trendelenburg observed at present.  Pt's L ankle brace very helpful for gait mechanics, no deviations observed that were observed/documented last session.   Pain end of session:  0/10    Plan:     Continue with current POC/no changes  Next session: work on gait with ankle brace donned - provide pt with additional HEP to improve deviations (increase lateral hip/glute strengthening). Providing progressions of exercises is advised since the pt anticipates a lapse in PT apts d/t travelling after next session.     Assessment of current progress against " "goals:  Insufficient treatment time to assess progress  Goals:  Active       PT Problem       PT STG       Start:  07/09/25    Expected End:  08/23/25       - Pt will complete the HEP with <3 verbal cues for correction,   - Pt will demonstrate 2pt improvement on the NPRS, allowing for improved tolerance of functional activities.,   - Pt will demonstrate at least 4/5 MMT grading throughout LE musculature, allowing for appropriate muscle recruitment during daily activities. ,   - Pt will demonstrate ability to perform 5 step ups to 8\" step leading with each LE and with 1 UE support in order to demonstrate improved ability to navigate stairs,          PT LTG       Start:  07/09/25    Expected End:  10/07/25       - Pt will be independent in HEP & symptom management,   - Pt will demonstrate a 4 pt improvement for hip pain on the NPRS, allowing for improved tolerance to perform daily functional activities. ,   - Pt will demonstrate 5/5 MMT grading throughout hip/knee musculature, allowing for appropriate muscle recruitment during daily activities. ,   - Pt will demonstrate ability to perform 10 step ups to 8\" step leading with each LE and with 1 UE support in order to demonstrate improved ability to navigate stairs,   - Pt will demonstrate ability to ambulate at least 350' with least restrictive/no device without breaks or increases in pain in order to demonstrate improved tolerance to prolonged ambulation.,   - Pt will perform tandem stance >30\" with EO leading with ea LE in order to demonstrate improved proprioception and stability for improved safety with navigation around obstacles.,   - Pt will demonstrate improved WOMAC score by 11 points (MCID) in order to demonstrate improved functional mobility. ,             "

## 2025-07-31 ENCOUNTER — TREATMENT (OUTPATIENT)
Dept: PHYSICAL THERAPY | Facility: CLINIC | Age: 70
End: 2025-07-31
Payer: MEDICARE

## 2025-07-31 DIAGNOSIS — M25.552 LEFT HIP PAIN: ICD-10-CM

## 2025-07-31 DIAGNOSIS — R26.89 OTHER ABNORMALITIES OF GAIT AND MOBILITY: ICD-10-CM

## 2025-07-31 DIAGNOSIS — M16.12 UNILATERAL PRIMARY OSTEOARTHRITIS, LEFT HIP: ICD-10-CM

## 2025-07-31 PROCEDURE — 97116 GAIT TRAINING THERAPY: CPT | Mod: GP,CQ

## 2025-07-31 PROCEDURE — 97110 THERAPEUTIC EXERCISES: CPT | Mod: GP,CQ

## 2025-07-31 ASSESSMENT — PAIN SCALES - GENERAL: PAINLEVEL_OUTOF10: 0 - NO PAIN

## 2025-10-13 ENCOUNTER — APPOINTMENT (OUTPATIENT)
Dept: PRIMARY CARE | Facility: CLINIC | Age: 70
End: 2025-10-13
Payer: MEDICARE